# Patient Record
Sex: FEMALE | Race: BLACK OR AFRICAN AMERICAN | Employment: FULL TIME | URBAN - METROPOLITAN AREA
[De-identification: names, ages, dates, MRNs, and addresses within clinical notes are randomized per-mention and may not be internally consistent; named-entity substitution may affect disease eponyms.]

---

## 2021-03-27 ENCOUNTER — APPOINTMENT (OUTPATIENT)
Dept: GENERAL RADIOLOGY | Age: 36
End: 2021-03-27
Payer: COMMERCIAL

## 2021-03-27 ENCOUNTER — HOSPITAL ENCOUNTER (EMERGENCY)
Age: 36
Discharge: HOME | End: 2021-03-27
Attending: EMERGENCY MEDICINE
Payer: COMMERCIAL

## 2021-03-27 VITALS
SYSTOLIC BLOOD PRESSURE: 140 MMHG | RESPIRATION RATE: 18 BRPM | WEIGHT: 220 LBS | TEMPERATURE: 98.2 F | OXYGEN SATURATION: 100 % | DIASTOLIC BLOOD PRESSURE: 78 MMHG | HEIGHT: 70 IN | HEART RATE: 80 BPM | BODY MASS INDEX: 31.5 KG/M2

## 2021-03-27 DIAGNOSIS — S83.511A SPRAIN OF ANTERIOR CRUCIATE LIGAMENT OF RIGHT KNEE, INITIAL ENCOUNTER: Primary | ICD-10-CM

## 2021-03-27 PROCEDURE — 99284 EMERGENCY DEPT VISIT MOD MDM: CPT

## 2021-03-27 PROCEDURE — 73562 X-RAY EXAM OF KNEE 3: CPT

## 2021-03-27 RX ORDER — ACETAMINOPHEN 500 MG
1000 TABLET ORAL ONCE
Status: DISCONTINUED | OUTPATIENT
Start: 2021-03-27 | End: 2021-03-27 | Stop reason: HOSPADM

## 2021-03-27 RX ORDER — IBUPROFEN 400 MG/1
400 TABLET ORAL EVERY 6 HOURS PRN
Qty: 30 TABLET | Refills: 0 | Status: SHIPPED | OUTPATIENT
Start: 2021-03-27

## 2021-03-27 RX ORDER — IBUPROFEN 800 MG/1
800 TABLET ORAL ONCE
Status: DISCONTINUED | OUTPATIENT
Start: 2021-03-27 | End: 2021-03-27 | Stop reason: HOSPADM

## 2021-03-27 NOTE — LETTER
[facility]  [address] [address]  Phone: [phone]  Fax: [phone]               March 27, 2021    Patient: Bette Miller   YOB: 1985   Date of Visit: 3/27/2021       To Whom It May Concern:    Bette Miller was seen and treated in our emergency department on 3/27/2021. She may return to work on Vishal the 28th.       Sincerely,       Jc Villalobos MD         Signature:__________________________________

## 2021-03-27 NOTE — ED NOTES
Wrapped patients right knee with 6 inch ace wrap per Dr. Darlyn Zamudio. Patient verbalized understanding of care taking instructions.       [facility]  03/27/21 2340

## 2021-03-27 NOTE — ED PROVIDER NOTES
COMPARTMENT SYNDROME, DEEP VENOUS THROMBOSIS, SEPTIC ARTHRITIS, TENDON OR NEUROVASCULAR INJURY, thus I consider the discharge disposition reasonable. Fran Mclaughlin and I have discussed the diagnosis and risks, and we agree with discharging home to follow-up with their primary doctor or the referral orthopedist. We also discussed returning to the Emergency Department immediately if new or worsening symptoms occur. We have discussed the symptoms which are most concerning (e.g., changing or worsening pain, numbness, weakness) that necessitate immediate return. Final Impression    1. Sprain of anterior cruciate ligament of right knee, initial encounter        Blood pressure (!) 140/78, pulse 80, temperature 98.2 °F (36.8 °C), temperature source Oral, resp. rate 18, height 5' 10\" (1.778 m), weight 220 lb (99.8 kg), last menstrual period 03/27/2021, SpO2 100 %. Patient was given scripts for the following medications. I counseled patient how to take these medications. Discharge Medication List as of 3/27/2021  2:15 AM      START taking these medications    Details   ibuprofen (ADVIL;MOTRIN) 400 MG tablet Take 1 tablet by mouth every 6 hours as needed for Pain, Disp-30 tablet, R-0Print             Disposition  Pt is in good condition upon Discharge to home. This chart was generated using the [employer] dictation system. I created this record but it may contain dictation errors.           Cale Stewart MD  03/27/21 2079

## 2023-01-09 PROBLEM — Z00.00 ENCOUNTER FOR PREVENTIVE HEALTH EXAMINATION: Status: ACTIVE | Noted: 2023-01-09

## 2023-01-17 ENCOUNTER — APPOINTMENT (OUTPATIENT)
Dept: OBGYN | Facility: CLINIC | Age: 38
End: 2023-01-17
Payer: COMMERCIAL

## 2023-01-17 ENCOUNTER — TRANSCRIPTION ENCOUNTER (OUTPATIENT)
Age: 38
End: 2023-01-17

## 2023-01-17 ENCOUNTER — NON-APPOINTMENT (OUTPATIENT)
Age: 38
End: 2023-01-17

## 2023-01-17 VITALS
DIASTOLIC BLOOD PRESSURE: 62 MMHG | SYSTOLIC BLOOD PRESSURE: 118 MMHG | BODY MASS INDEX: 35.93 KG/M2 | HEIGHT: 70 IN | WEIGHT: 251 LBS

## 2023-01-17 DIAGNOSIS — Z87.59 PERSONAL HISTORY OF OTHER COMPLICATIONS OF PREGNANCY, CHILDBIRTH AND THE PUERPERIUM: ICD-10-CM

## 2023-01-17 DIAGNOSIS — Z78.9 OTHER SPECIFIED HEALTH STATUS: ICD-10-CM

## 2023-01-17 DIAGNOSIS — Z82.49 FAMILY HISTORY OF ISCHEMIC HEART DISEASE AND OTHER DISEASES OF THE CIRCULATORY SYSTEM: ICD-10-CM

## 2023-01-17 DIAGNOSIS — D57.3 SICKLE-CELL TRAIT: ICD-10-CM

## 2023-01-17 DIAGNOSIS — Z80.3 FAMILY HISTORY OF MALIGNANT NEOPLASM OF BREAST: ICD-10-CM

## 2023-01-17 PROCEDURE — 36415 COLL VENOUS BLD VENIPUNCTURE: CPT

## 2023-01-17 PROCEDURE — 0501F PRENATAL FLOW SHEET: CPT

## 2023-01-18 PROBLEM — Z82.49 FAMILY HISTORY OF HYPERTENSION: Status: ACTIVE | Noted: 2023-01-18

## 2023-01-18 PROBLEM — D57.3 SICKLE CELL TRAIT: Status: ACTIVE | Noted: 2023-01-18

## 2023-01-18 PROBLEM — Z78.9 DOES NOT USE ILLICIT DRUGS: Status: ACTIVE | Noted: 2023-01-18

## 2023-01-18 PROBLEM — Z78.9 SOCIAL ALCOHOL USE: Status: ACTIVE | Noted: 2023-01-18

## 2023-01-18 PROBLEM — Z78.9 NON-SMOKER: Status: ACTIVE | Noted: 2023-01-18

## 2023-01-18 PROBLEM — Z87.59 HISTORY OF ECTOPIC PREGNANCY: Status: RESOLVED | Noted: 2023-01-18 | Resolved: 2023-01-18

## 2023-01-18 PROBLEM — Z80.3 FAMILY HISTORY OF BREAST CANCER: Status: ACTIVE | Noted: 2023-01-18

## 2023-01-18 LAB
ABO + RH PNL BLD: NORMAL
B19V IGG SER QL IA: 0.42 INDEX
B19V IGG+IGM SER-IMP: NEGATIVE
B19V IGG+IGM SER-IMP: NORMAL
B19V IGM FLD-ACNC: 0.16 INDEX
B19V IGM SER-ACNC: NEGATIVE
BASOPHILS # BLD AUTO: 0.03 K/UL
BASOPHILS NFR BLD AUTO: 0.3 %
BILIRUB UR QL STRIP: NORMAL
BLD GP AB SCN SERPL QL: NORMAL
EOSINOPHIL # BLD AUTO: 0.15 K/UL
EOSINOPHIL NFR BLD AUTO: 1.5 %
GLUCOSE UR-MCNC: NORMAL
HBV SURFACE AG SERPL QL IA: NONREACTIVE
HCG UR QL: 0.2 EU/DL
HCT VFR BLD CALC: 38 %
HCV AB SER QL: NONREACTIVE
HCV S/CO RATIO: 0.06 S/CO
HGB BLD-MCNC: 12 G/DL
HGB UR QL STRIP.AUTO: NORMAL
HIV1+2 AB SPEC QL IA.RAPID: NONREACTIVE
IMM GRANULOCYTES NFR BLD AUTO: 0.8 %
KETONES UR-MCNC: NORMAL
LEUKOCYTE ESTERASE UR QL STRIP: NORMAL
LYMPHOCYTES # BLD AUTO: 1.41 K/UL
LYMPHOCYTES NFR BLD AUTO: 14.4 %
MAN DIFF?: NORMAL
MCHC RBC-ENTMCNC: 27.5 PG
MCHC RBC-ENTMCNC: 31.6 GM/DL
MCV RBC AUTO: 87.2 FL
MEV IGG FLD QL IA: >300 AU/ML
MEV IGG+IGM SER-IMP: POSITIVE
MONOCYTES # BLD AUTO: 0.67 K/UL
MONOCYTES NFR BLD AUTO: 6.8 %
NEUTROPHILS # BLD AUTO: 7.45 K/UL
NEUTROPHILS NFR BLD AUTO: 76.2 %
NITRITE UR QL STRIP: NORMAL
PH UR STRIP: 5.5
PLATELET # BLD AUTO: 317 K/UL
PROT UR STRIP-MCNC: NORMAL
RBC # BLD: 4.36 M/UL
RBC # FLD: 14.6 %
RUBV IGG FLD-ACNC: 4.7 INDEX
RUBV IGG SER-IMP: POSITIVE
SOURCE AMPLIFICATION: NORMAL
SP GR UR STRIP: 1.02
T PALLIDUM AB SER QL IA: NEGATIVE
T VAGINALIS RRNA SPEC QL NAA+PROBE: NOT DETECTED
TSH SERPL-ACNC: 1.03 UIU/ML
VZV AB TITR SER: NEGATIVE
VZV IGG SER IF-ACNC: 43.5 INDEX
WBC # FLD AUTO: 9.79 K/UL

## 2023-01-18 RX ORDER — ASCORBIC ACID, CHOLECALCIFEROL, .ALPHA.-TOCOPHEROL ACETATE, DL-, PYRIDOXINE, FOLIC ACID, CYANOCOBALAMIN, CALCIUM, FERROUS FUMARATE, MAGNESIUM, DOCONEXENT 85; 200; 10; 25; 1; 12; 140; 27; 45; 300 [IU]/1; [IU]/1; [IU]/1; [IU]/1; MG/1; UG/1; MG/1; MG/1; MG/1; MG/1
27-0.6-0.4-3 CAPSULE, GELATIN COATED ORAL
Qty: 1 | Refills: 11 | Status: ACTIVE | COMMUNITY
Start: 2023-01-18 | End: 1900-01-01

## 2023-01-20 ENCOUNTER — NON-APPOINTMENT (OUTPATIENT)
Age: 38
End: 2023-01-20

## 2023-01-20 DIAGNOSIS — Z28.39 SUPERVISION OF OTHER HIGH RISK PREGNANCIES, UNSPECIFIED TRIMESTER: ICD-10-CM

## 2023-01-20 DIAGNOSIS — O09.899 SUPERVISION OF OTHER HIGH RISK PREGNANCIES, UNSPECIFIED TRIMESTER: ICD-10-CM

## 2023-01-22 LAB
BACTERIA UR CULT: NORMAL
C TRACH RRNA SPEC QL NAA+PROBE: NOT DETECTED
HGB A MFR BLD: 62.8 %
HGB A2 MFR BLD: 3.2 %
HGB FRACT BLD-IMP: NORMAL
HGB S BLD QL SOLY: POSITIVE
HGB S MFR BLD: 34 %
LEAD BLD-MCNC: <1 UG/DL
N GONORRHOEA RRNA SPEC QL NAA+PROBE: NOT DETECTED
SOURCE AMPLIFICATION: NORMAL

## 2023-01-23 ENCOUNTER — NON-APPOINTMENT (OUTPATIENT)
Age: 38
End: 2023-01-23

## 2023-01-23 PROBLEM — O09.899 MATERNAL VARICELLA, NON-IMMUNE: Status: RESOLVED | Noted: 2023-01-20 | Resolved: 2023-01-23

## 2023-01-26 ENCOUNTER — NON-APPOINTMENT (OUTPATIENT)
Age: 38
End: 2023-01-26

## 2023-02-09 ENCOUNTER — ASOB RESULT (OUTPATIENT)
Age: 38
End: 2023-02-09

## 2023-02-09 ENCOUNTER — APPOINTMENT (OUTPATIENT)
Dept: ANTEPARTUM | Facility: CLINIC | Age: 38
End: 2023-02-09
Payer: COMMERCIAL

## 2023-02-09 PROCEDURE — 76811 OB US DETAILED SNGL FETUS: CPT

## 2023-02-09 PROCEDURE — 76817 TRANSVAGINAL US OBSTETRIC: CPT | Mod: 59

## 2023-02-16 ENCOUNTER — APPOINTMENT (OUTPATIENT)
Dept: ANTEPARTUM | Facility: CLINIC | Age: 38
End: 2023-02-16

## 2023-02-16 ENCOUNTER — APPOINTMENT (OUTPATIENT)
Dept: ANTEPARTUM | Facility: CLINIC | Age: 38
End: 2023-02-16
Payer: COMMERCIAL

## 2023-02-16 ENCOUNTER — ASOB RESULT (OUTPATIENT)
Age: 38
End: 2023-02-16

## 2023-02-16 PROCEDURE — 76816 OB US FOLLOW-UP PER FETUS: CPT

## 2023-02-24 ENCOUNTER — APPOINTMENT (OUTPATIENT)
Dept: OBGYN | Facility: CLINIC | Age: 38
End: 2023-02-24
Payer: COMMERCIAL

## 2023-02-24 VITALS
BODY MASS INDEX: 37.22 KG/M2 | SYSTOLIC BLOOD PRESSURE: 122 MMHG | HEIGHT: 70 IN | WEIGHT: 260 LBS | DIASTOLIC BLOOD PRESSURE: 70 MMHG

## 2023-02-24 LAB
BILIRUB UR QL STRIP: NORMAL
GLUCOSE UR-MCNC: NORMAL
HCG UR QL: 0.2 EU/DL
HGB UR QL STRIP.AUTO: NORMAL
KETONES UR-MCNC: NORMAL
LEUKOCYTE ESTERASE UR QL STRIP: NORMAL
NITRITE UR QL STRIP: NORMAL
PH UR STRIP: 6
PROT UR STRIP-MCNC: NORMAL
SP GR UR STRIP: 1.02

## 2023-02-24 PROCEDURE — 0502F SUBSEQUENT PRENATAL CARE: CPT

## 2023-03-19 ENCOUNTER — NON-APPOINTMENT (OUTPATIENT)
Age: 38
End: 2023-03-19

## 2023-03-22 ENCOUNTER — NON-APPOINTMENT (OUTPATIENT)
Age: 38
End: 2023-03-22

## 2023-03-22 ENCOUNTER — APPOINTMENT (OUTPATIENT)
Dept: ANTEPARTUM | Facility: CLINIC | Age: 38
End: 2023-03-22

## 2023-03-22 ENCOUNTER — INPATIENT (INPATIENT)
Facility: HOSPITAL | Age: 38
LOS: 0 days | Discharge: HOME | End: 2023-03-23
Attending: OBSTETRICS & GYNECOLOGY | Admitting: OBSTETRICS & GYNECOLOGY
Payer: COMMERCIAL

## 2023-03-22 VITALS
HEART RATE: 106 BPM | RESPIRATION RATE: 12 BRPM | TEMPERATURE: 98 F | DIASTOLIC BLOOD PRESSURE: 71 MMHG | SYSTOLIC BLOOD PRESSURE: 117 MMHG

## 2023-03-22 DIAGNOSIS — Z87.59 PERSONAL HISTORY OF OTHER COMPLICATIONS OF PREGNANCY, CHILDBIRTH AND THE PUERPERIUM: Chronic | ICD-10-CM

## 2023-03-22 DIAGNOSIS — Z98.890 OTHER SPECIFIED POSTPROCEDURAL STATES: Chronic | ICD-10-CM

## 2023-03-22 DIAGNOSIS — O26.899 OTHER SPECIFIED PREGNANCY RELATED CONDITIONS, UNSPECIFIED TRIMESTER: ICD-10-CM

## 2023-03-22 LAB
ALBUMIN SERPL ELPH-MCNC: 3.9 G/DL — SIGNIFICANT CHANGE UP (ref 3.3–5)
ALP SERPL-CCNC: 79 U/L — SIGNIFICANT CHANGE UP (ref 40–120)
ALT FLD-CCNC: 14 U/L — SIGNIFICANT CHANGE UP (ref 4–33)
ANION GAP SERPL CALC-SCNC: 12 MMOL/L — SIGNIFICANT CHANGE UP (ref 7–14)
APPEARANCE UR: ABNORMAL
APTT BLD: 30.1 SEC — SIGNIFICANT CHANGE UP (ref 27–36.3)
AST SERPL-CCNC: 14 U/L — SIGNIFICANT CHANGE UP (ref 4–32)
BACTERIA # UR AUTO: NEGATIVE — SIGNIFICANT CHANGE UP
BASOPHILS # BLD AUTO: 0.03 K/UL — SIGNIFICANT CHANGE UP (ref 0–0.2)
BASOPHILS NFR BLD AUTO: 0.2 % — SIGNIFICANT CHANGE UP (ref 0–2)
BILIRUB SERPL-MCNC: 0.2 MG/DL — SIGNIFICANT CHANGE UP (ref 0.2–1.2)
BILIRUB UR-MCNC: NEGATIVE — SIGNIFICANT CHANGE UP
BUN SERPL-MCNC: 8 MG/DL — SIGNIFICANT CHANGE UP (ref 7–23)
CALCIUM SERPL-MCNC: 9.1 MG/DL — SIGNIFICANT CHANGE UP (ref 8.4–10.5)
CHLORIDE SERPL-SCNC: 105 MMOL/L — SIGNIFICANT CHANGE UP (ref 98–107)
CO2 SERPL-SCNC: 20 MMOL/L — LOW (ref 22–31)
COLOR SPEC: YELLOW — SIGNIFICANT CHANGE UP
CREAT ?TM UR-MCNC: 133 MG/DL — SIGNIFICANT CHANGE UP
CREAT SERPL-MCNC: 0.7 MG/DL — SIGNIFICANT CHANGE UP (ref 0.5–1.3)
DIFF PNL FLD: NEGATIVE — SIGNIFICANT CHANGE UP
EGFR: 112 ML/MIN/1.73M2 — SIGNIFICANT CHANGE UP
EOSINOPHIL # BLD AUTO: 0.19 K/UL — SIGNIFICANT CHANGE UP (ref 0–0.5)
EOSINOPHIL NFR BLD AUTO: 1.6 % — SIGNIFICANT CHANGE UP (ref 0–6)
EPI CELLS # UR: 2 /HPF — SIGNIFICANT CHANGE UP (ref 0–5)
FIBRINOGEN PPP-MCNC: 802 MG/DL — HIGH (ref 200–465)
GLUCOSE SERPL-MCNC: 109 MG/DL — HIGH (ref 70–99)
GLUCOSE UR QL: NEGATIVE — SIGNIFICANT CHANGE UP
HCT VFR BLD CALC: 30.4 % — LOW (ref 34.5–45)
HGB BLD-MCNC: 9.6 G/DL — LOW (ref 11.5–15.5)
HYALINE CASTS # UR AUTO: 7 /LPF — SIGNIFICANT CHANGE UP (ref 0–7)
IANC: 9.05 K/UL — HIGH (ref 1.8–7.4)
IMM GRANULOCYTES NFR BLD AUTO: 2.4 % — HIGH (ref 0–0.9)
INR BLD: 1.03 RATIO — SIGNIFICANT CHANGE UP (ref 0.88–1.16)
KETONES UR-MCNC: NEGATIVE — SIGNIFICANT CHANGE UP
LDH SERPL L TO P-CCNC: 138 U/L — SIGNIFICANT CHANGE UP (ref 135–225)
LEUKOCYTE ESTERASE UR-ACNC: NEGATIVE — SIGNIFICANT CHANGE UP
LYMPHOCYTES # BLD AUTO: 1.42 K/UL — SIGNIFICANT CHANGE UP (ref 1–3.3)
LYMPHOCYTES # BLD AUTO: 11.8 % — LOW (ref 13–44)
MCHC RBC-ENTMCNC: 26.7 PG — LOW (ref 27–34)
MCHC RBC-ENTMCNC: 31.6 GM/DL — LOW (ref 32–36)
MCV RBC AUTO: 84.7 FL — SIGNIFICANT CHANGE UP (ref 80–100)
MONOCYTES # BLD AUTO: 1.09 K/UL — HIGH (ref 0–0.9)
MONOCYTES NFR BLD AUTO: 9 % — SIGNIFICANT CHANGE UP (ref 2–14)
NEUTROPHILS # BLD AUTO: 9.05 K/UL — HIGH (ref 1.8–7.4)
NEUTROPHILS NFR BLD AUTO: 75 % — SIGNIFICANT CHANGE UP (ref 43–77)
NITRITE UR-MCNC: NEGATIVE — SIGNIFICANT CHANGE UP
NRBC # BLD: 0 /100 WBCS — SIGNIFICANT CHANGE UP (ref 0–0)
NRBC # FLD: 0 K/UL — SIGNIFICANT CHANGE UP (ref 0–0)
PH UR: 6 — SIGNIFICANT CHANGE UP (ref 5–8)
PLATELET # BLD AUTO: 334 K/UL — SIGNIFICANT CHANGE UP (ref 150–400)
POTASSIUM SERPL-MCNC: 3.9 MMOL/L — SIGNIFICANT CHANGE UP (ref 3.5–5.3)
POTASSIUM SERPL-SCNC: 3.9 MMOL/L — SIGNIFICANT CHANGE UP (ref 3.5–5.3)
PROT ?TM UR-MCNC: 15 MG/DL — SIGNIFICANT CHANGE UP
PROT ?TM UR-MCNC: 15 MG/DL — SIGNIFICANT CHANGE UP
PROT SERPL-MCNC: 7 G/DL — SIGNIFICANT CHANGE UP (ref 6–8.3)
PROT UR-MCNC: ABNORMAL
PROT/CREAT UR-RTO: 0.1 RATIO — SIGNIFICANT CHANGE UP (ref 0–0.2)
PROTHROM AB SERPL-ACNC: 11.9 SEC — SIGNIFICANT CHANGE UP (ref 10.5–13.4)
RBC # BLD: 3.59 M/UL — LOW (ref 3.8–5.2)
RBC # FLD: 13.5 % — SIGNIFICANT CHANGE UP (ref 10.3–14.5)
RBC CASTS # UR COMP ASSIST: 5 /HPF — HIGH (ref 0–4)
SODIUM SERPL-SCNC: 137 MMOL/L — SIGNIFICANT CHANGE UP (ref 135–145)
SP GR SPEC: 1.02 — SIGNIFICANT CHANGE UP (ref 1.01–1.05)
URATE SERPL-MCNC: 3.9 MG/DL — SIGNIFICANT CHANGE UP (ref 2.5–7)
UROBILINOGEN FLD QL: SIGNIFICANT CHANGE UP
WBC # BLD: 12.07 K/UL — HIGH (ref 3.8–10.5)
WBC # FLD AUTO: 12.07 K/UL — HIGH (ref 3.8–10.5)
WBC UR QL: 43 /HPF — HIGH (ref 0–5)

## 2023-03-22 RX ORDER — ACETAMINOPHEN 500 MG
1000 TABLET ORAL ONCE
Refills: 0 | Status: COMPLETED | OUTPATIENT
Start: 2023-03-22 | End: 2023-03-22

## 2023-03-22 RX ORDER — SODIUM CHLORIDE 9 MG/ML
1000 INJECTION, SOLUTION INTRAVENOUS ONCE
Refills: 0 | Status: COMPLETED | OUTPATIENT
Start: 2023-03-22 | End: 2023-03-22

## 2023-03-22 RX ORDER — ACETAMINOPHEN 500 MG
1000 TABLET ORAL ONCE
Refills: 0 | Status: DISCONTINUED | OUTPATIENT
Start: 2023-03-22 | End: 2023-03-22

## 2023-03-22 RX ORDER — DIPHENHYDRAMINE HCL 50 MG
25 CAPSULE ORAL ONCE
Refills: 0 | Status: COMPLETED | OUTPATIENT
Start: 2023-03-22 | End: 2023-03-22

## 2023-03-22 RX ORDER — METOCLOPRAMIDE HCL 10 MG
10 TABLET ORAL ONCE
Refills: 0 | Status: COMPLETED | OUTPATIENT
Start: 2023-03-22 | End: 2023-03-22

## 2023-03-22 RX ADMIN — Medication 400 MILLIGRAM(S): at 19:11

## 2023-03-22 RX ADMIN — Medication 25 MILLIGRAM(S): at 14:48

## 2023-03-22 RX ADMIN — Medication 2 TABLET(S): at 23:43

## 2023-03-22 RX ADMIN — Medication 25 MILLIGRAM(S): at 23:50

## 2023-03-22 RX ADMIN — Medication 1000 MILLIGRAM(S): at 19:40

## 2023-03-22 RX ADMIN — SODIUM CHLORIDE 1000 MILLILITER(S): 9 INJECTION, SOLUTION INTRAVENOUS at 18:52

## 2023-03-22 RX ADMIN — Medication 10 MILLIGRAM(S): at 14:48

## 2023-03-22 NOTE — OB PROVIDER TRIAGE NOTE - HISTORY OF PRESENT ILLNESS
Ms. RAOUL PRUITT is a 38y  28w2d p/w frontal headache 6 days. Comes and goes. Worse with movement and light. Improves with tylenol, last dose 11:30am 1000mg.     PNC: Denies prenatal issues or complications. Pt reports no hospitalizations, procedures, infections, or new diagnoses in pregnancy. Pt denies complications with blood pressure and/or blood sugar. LMP:  Ms. RAOUL PRUITT is a 38y  28w2d p/w frontal headache 6 days. Comes and goes. Worse with movement and light. Improves with tylenol, last dose 11:30am 1000mg. Has been getting very little sleep during the pregnancy because just cannot fell asleep when lying in bed. + FM. Denies ctx / vb / lof , head trauma, nausea, vomiting, strenuous activity, abrupt onset, history of migraines, recent injections of back, fevers, chills, new rashes, recent life-style changes or significant life events, anxiety / depression, issues with BP, right-sided abdominal pain, visual disturbances. C/S @ 37w due to hx of myomectomy.   PNC: Denies prenatal issues or complications. Pt reports no hospitalizations, procedures, infections, or new diagnoses in pregnancy. Pt denies complications with blood pressure and/or blood sugar. LMP:

## 2023-03-22 NOTE — OB RN TRIAGE NOTE - FALL HARM RISK - UNIVERSAL INTERVENTIONS
Bed in lowest position, wheels locked, appropriate side rails in place/Call bell, personal items and telephone in reach/Instruct patient to call for assistance before getting out of bed or chair/Non-slip footwear when patient is out of bed/[place] to call system/Physically safe environment - no spills, clutter or unnecessary equipment/Purposeful Proactive Rounding/Room/bathroom lighting operational, light cord in reach

## 2023-03-22 NOTE — OB PROVIDER TRIAGE NOTE - NSHPPHYSICALEXAM_GEN_ALL_CORE
T(C): 36.9 (03-22-23 @ 13:55), Max: 36.9 (03-22-23 @ 13:55)  HR: 80 (03-22-23 @ 15:17) (80 - 106)  BP: 98/53 (03-22-23 @ 15:17) (98/53 - 127/64)  RR: 12 (03-22-23 @ 13:55) (12 - 12)  SpO2: --  General: Female sitting comfortably. Squinting eyes from light.   Head: Normocephalic. Atraumatic.   Eyes: No discharge, lids normal, conjunctiva normal  Lungs: No resp distress  Abdomen: Soft, nontender. Gravid.   TAUS:  Sono saved in ASOB.   Neuro: No facial asymmetry, no slurred speech, moves all 4 extremities  Mood: Alert and lucid, appropriate mood and affect

## 2023-03-22 NOTE — CONSULT NOTE ADULT - SUBJECTIVE AND OBJECTIVE BOX
Neurology - Consult Note    -  Spectra: [phone] ([facility]), [id number] ([facility])  -    HPI: Patient RAOUL PRUITT is a 38y (1985)   28w2d  woman with a PMHx significant for ectopic pregnancy 2002 presented with L frontal HA of 6 days duration. HA has been intermittent, 9/10 at worst (currently 8/10), throbbing, initially came on over an hour duration, worsened with any minor movement in any direction, with associated photophobia and nausea but pt denies any phonophobia, neck pain/stiffness, fever, chills, diplopia, tinnitus, blurry vision, vomiting, numbness, tingling, focal weakness, changes in vision/hearing, trauma, hx of headaches/migraines, use of retinoids, smoking hx, birth control pills, hx of strokes/seizures. . Also states that headache does not get worse with changes in position- particularly from sitting to laying flat or from laying flat to sitting up. Pt took 1000 mg tylenol Q6 hours for the first 2 days but then stopped taking it- helped improve the HA but it came back after she stopped taking it. Received reglan and benadryl here in hospital which improved the HA a little bit. LMP 2022. Takes prenatals and baby ASA daily.       Review of Systems:   CONSTITUTIONAL: No fevers or chills  EYES AND ENT: No visual changes or no throat pain   NECK: No pain or stiffness  RESPIRATORY: No hemoptysis or shortness of breath  CARDIOVASCULAR: No chest pain or palpitations  NEUROLOGICAL: +As stated in HPI above  SKIN: No itching, burning, rashes, or lesions   All other review of systems is negative unless indicated above.    Allergies: NKDA      PMHx/PSHx/Family Hx: As above, otherwise see below       Social Hx:  No current use of tobacco, alcohol, or illicit drugs    Medications:  MEDICATIONS  (STANDING):    MEDICATIONS  (PRN):      Vitals:  T(C): 36.9 (23 @ 13:55), Max: 36.9 (23 @ 13:55)  HR: 100 (23 @ 17:17) (78 - 106)  BP: 120/61 (23 @ 17:17) (98/53 - 127/64)  RR: 12 (23 @ 13:55) (12 - )  SpO2: --    Physical Examination:   General - NAD  Cardiovascular - Peripheral pulses palpable, no edema  Eyes - Fundoscopy not performed due to safety precautions in the setting of the COVID-19 pandemic    Neurologic Exam:  Mental status - Awake, Alert, Oriented to person, place, and time. Speech fluent, repetition and naming intact. Follows simple and complex commands. Attention/concentration, recent and remote memory (including registration and recall), and fund of knowledge intact    Cranial nerves - PERRLA, VFF, EOMI, no diplopia or nystagmus. face sensation (V1-V3) intact b/l, facial strength intact without asymmetry b/l, hearing intact b/l, palate with symmetric elevation, sternocleidomastiod 5/5 strength b/l, tongue midline on protrusion with full lateral movement    Motor - Normal bulk and tone throughout. No pronator drift.  Strength testing            Deltoid      Biceps      Triceps     Wrist Extension    Wrist Flexion     Interossei         R            5                 5               5                     5                              5                        5                 5  L             5                 5               5                     5                              5                        5                 5              Hip Flexion    Hip Extension    Knee Flexion    Knee Extension    Dorsiflexion    Plantar Flexion  R              5                           5                       5                           5                            5                          5  L              5                           5                        5                           5                            5                          5    Sensation - Light touch/temperature  intact throughout    DTR's -             Biceps      Triceps     Brachioradialis      Patellar    Ankle    Toes/plantar response  R             2+             2+                  2+              3+            2+                 Down  L              2+             2+                 2+               3+           2+                 Down    Coordination - Finger to Nose intact b/l. No tremors appreciated    Gait and station - unable to perform due to fall risk    Labs:                        9.6    12.07 )-----------( 334      ( 22 Mar 2023 14:35 )             30.4         137  |  105  |  8   ----------------------------<  109<H>  3.9   |  20<L>  |  0.70    Ca    9.1      22 Mar 2023 14:35    TPro  7.0  /  Alb  3.9  /  TBili  0.2  /  DBili  x   /  AST  14  /  ALT  14  /  AlkPhos  79      CAPILLARY BLOOD GLUCOSE        LIVER FUNCTIONS - ( 22 Mar 2023 14:35 )  Alb: 3.9 g/dL / Pro: 7.0 g/dL / ALK PHOS: 79 U/L / ALT: 14 U/L / AST: 14 U/L / GGT: x             PT/INR - ( 22 Mar 2023 14:35 )   PT: 11.9 sec;   INR: 1.03 ratio         PTT - ( 22 Mar 2023 14:35 )  PTT:30.1 sec  CSF:                  Radiology:

## 2023-03-22 NOTE — CONSULT NOTE ADULT - ASSESSMENT
Patient RAOUL PRUITT is a 38y (1985)   28w2d  woman with a PMHx significant for ectopic pregnancy  presented with L frontal HA of 6 days duration for which neurology called.     Neuro exam nonfocal.     Impression: Intermittent L sided HA with associated photophobia/nausea likely 2/2 new onset migraines. Also on the differential is CVST but will need brain venous imaging to rule out thrombosis, also on the differential is cervical neuralgia but may be of lower concern as pt reports no neck pain or stiffness.     Recommendations:   MRI brain w/o contrast  MRV head/neck with contrast  MRA head w/o contrast, MRA neck with contrast  PT/OT    HA management:   []If good kidney function, toradol 30 mg IV push + 10 mg reglan IV push + 25 mg benadryl IV push at once   Otherwise Tylenol 1 gram IV + 10 mg reglan IV push + 25 mg benadryl IV push at once    [] Darken/quiet room  [] Encourage avoidance of common migraine triggers (artificial sweeteners, food preservative (MSG), aged cheese, skipping meals, change in wake/sleep cycle and intense physical exertion)  [] Encourage lifestyle changes that help migraine: physical exercise, fixed meal time, fixed sleep/wake cycle, avoid smoking and highly caffeinated products     Patient can follow up with neurologist Dr. denise after discharge. Please instruct the patient to call [phone] to schedule an appointment. Office is located at [address] [place] [address].     To be discussed with neurology attending and will be formally staffed by attending during morning rounds. Recommendations will be finalized once signed by attending.      Patient RAOUL PRUITT is a 38y (1985)   28w2d  woman with a PMHx significant for ectopic pregnancy  presented with L frontal HA of 6 days duration for which neurology called.     Neuro exam nonfocal.     Impression: Intermittent L sided HA with associated photophobia/nausea likely 2/2 new onset migraines. Also on the differential is CVST but will need brain venous imaging to rule out thrombosis, also on the differential is cervical neuralgia but may be of lower concern as pt reports no neck pain or stiffness.     Recommendations:   MRI brain w/o contrast  MRV head/neck with contrast (only imaging that really need contrast for to visualize)  MRA head w/o contrast, MRA neck with contrast  PT/OT    HA management:   []If good kidney function, toradol 30 mg IV push + 10 mg reglan IV push + 25 mg benadryl IV push at once, if no contraindications  Otherwise Tylenol 1 gram IV + 10 mg reglan IV push + 25 mg benadryl IV push at once, if no contrast    [] Darken/quiet room  [] Encourage avoidance of common migraine triggers (artificial sweeteners, food preservative (MSG), aged cheese, skipping meals, change in wake/sleep cycle and intense physical exertion)  [] Encourage lifestyle changes that help migraine: physical exercise, fixed meal time, fixed sleep/wake cycle, avoid smoking and highly caffeinated products     Patient can follow up with neurologist Dr. denise after discharge. Please instruct the patient to call [phone] to schedule an appointment. Office is located at [address] [place] [address].     To be discussed with neurology attending and will be formally staffed by attending during morning rounds. Recommendations will be finalized once signed by attending.

## 2023-03-22 NOTE — OB RN TRIAGE NOTE - SUICIDE SCREENING QUESTION 3
“You can access the FollowHealth Patient Portal, offered by [facility], by registering with the following website: [email or web address]” No

## 2023-03-22 NOTE — OB PROVIDER TRIAGE NOTE - NSOBPROVIDERNOTE_OBGYN_ALL_OB_FT
Ms. RAOUL PRUITT is a 38y  28w2d p/w frontal headache 6 days.    - Reglan 10, benadryl 25 IV   - PECC labs  - Continue to observe Ms. RAOUL PRUITT is a 38y  28w2d p/w frontal headache 6 days.    - Reglan 10, benadryl 25 IV   - PECC labs  - Continue to observe  - 15:32: Patient sleeping comfortably. Notes that she feels 80% better. Ms. RAOUL PRUITT is a 38y  28w2d p/w frontal headache 6 days without evidence of preeclampsia.     - Reglan 10, benadryl 25 IV   - PECC labs  - Continue to observe  - 15:32: Patient sleeping comfortably. Notes that she feels 80% better.  - 16:30: Patient awoken for BPP and notes that headache is slightly better but returning.   - Dr. Marx notified, recommendation for neurology consult     Plan   [] Neurology consult Ms. RAOUL PRUITT is a 38y  28w2d p/w frontal headache 6 days without evidence of preeclampsia.     - Reglan 10, benadryl 25 IV   - PECC labs  - Continue to observe  - 15:32: Patient sleeping comfortably. Notes that she feels 80% better.  - 16:30: Patient awoken for BPP and notes that headache is slightly better but returning.   - Dr. Marx notified, recommendation for neurology consult     Plan   [] Neurology consult placed Ms. RAOUL PRUITT is a 38y  28w2d p/w frontal headache 6 days without evidence of preeclampsia.     - Reglan 10, benadryl 25 IV   - PECC labs  - Continue to observe  - 15:32: Patient sleeping comfortably. Notes that she feels 80% better.  - 16:30: Patient awoken for BPP and notes that headache is slightly better but returning.   - Dr. Marx notified, recommendation for neurology consult   - 19:00: Radiology called to protocol imaging.   - Radiology technician @ [id number] without response  - Patient would like pain medication. For 1000mg IV Tylenol and 1 liter LR, d/w Dr. Hernandez    Plan   [] Patient to wait in alcove pending MRA / MRV without contrast    Plan d/w Dr. Hernandez Ms. RAOUL PRUITT is a 38y  28w2d p/w frontal headache 6 days without evidence of preeclampsia.     - Reglan 10, benadryl 25 IV   - PECC labs  - Continue to observe  - 15:32: Patient sleeping comfortably. Notes that she feels 80% better.  - 16:30: Patient awoken for BPP and notes that headache is slightly better but returning.   - Dr. Marx notified, recommendation for neurology consult   - 19:00: Radiology called to protocol imaging.   - Radiology technician @ 0385 without response  - Patient would like pain medication. For 1000mg IV Tylenol and 1 liter LR, d/w Dr. Hernandez    Plan   [] Patient to wait in alcove pending MRA / MRV without contrast    Plan d/w Dr. Hernandez    19:15 PABLO Hicks NP  Received care of patient   Full relief from IV Tylenol as per patient, 0/10 pain   20:30  Called Radiology, pending MRI, they are aware, no timeline of test Ms. AROUL PRUITT is a 38y  28w2d p/w frontal headache 6 days without evidence of preeclampsia.     - Reglan 10, benadryl 25 IV   - PECC labs  - Continue to observe  - 15:32: Patient sleeping comfortably. Notes that she feels 80% better.  - 16:30: Patient awoken for BPP and notes that headache is slightly better but returning.   - Dr. Marx notified, recommendation for neurology consult   - 19:00: Radiology called to protocol imaging.   - Radiology technician @ 0413 without response  - Patient would like pain medication. For 1000mg IV Tylenol and 1 liter LR, d/w Dr. Hernandez    Plan   [] Patient to wait in alcove pending MRA / MRV without contrast    Plan d/w Dr. Hernandez    19:15 PABLO Hicks NP  Received care of patient   Full relief from IV Tylenol as per patient, 0/10 pain   20:30  Called Radiology, pending MRI, they are aware, no timeline of test  21:00 Dr. Hernandez made aware of relief, requesting neuro be made aware if MRI should still be pending inhouse  21:09 Neuro endorses patient wait for MRI in hospital setting Ms. RAOUL PRUITT is a 38y  28w2d p/w frontal headache 6 days without evidence of preeclampsia.     - Reglan 10, benadryl 25 IV   - PECC labs  - Continue to observe  - 15:32: Patient sleeping comfortably. Notes that she feels 80% better.  - 16:30: Patient awoken for BPP and notes that headache is slightly better but returning.   - Dr. Marx notified, recommendation for neurology consult   - 19:00: Radiology called to protocol imaging.   - Radiology technician @ 4283 without response  - Patient would like pain medication. For 1000mg IV Tylenol and 1 liter LR, d/w Dr. Hernandez    Plan   [] Patient to wait in alcove pending MRA / MRV without contrast    Plan d/w Dr. Hernandez    19:15 PABLO Hicks NP  Received care of patient   Full relief from IV Tylenol as per patient, 0/10 pain   20:30  Called Radiology, pending MRI, they are aware, no timeline of test  21:00 Dr. Hernandez made aware of relief, requesting neuro be made aware if MRI should still be pending inhouse  21:09 Neuro endorses patient wait for MRI in hospital setting  23:00 Patient c/o HA pain 10/10, Dr. Clifton made aware, Fioricet order Ms. RAOUL PRUITT is a 38y  28w2d p/w frontal headache 6 days without evidence of preeclampsia.     - Reglan 10, benadryl 25 IV   - PECC labs  - Continue to observe  - 15:32: Patient sleeping comfortably. Notes that she feels 80% better.  - 16:30: Patient awoken for BPP and notes that headache is slightly better but returning.   - Dr. Marx notified, recommendation for neurology consult   - 19:00: Radiology called to protocol imaging.   - Radiology technician @ 4202 without response  - Patient would like pain medication. For 1000mg IV Tylenol and 1 liter LR, d/w Dr. Hernandez    Plan   [] Patient to wait in alcove pending MRA / MRV without contrast    Plan d/w Dr. Hernandez    19:15 PABLO Hicks NP  Received care of patient   Full relief from IV Tylenol as per patient, 0/10 pain   20:30  Called Radiology, pending MRI, they are aware, no timeline of test  21:00 Dr. Hernandez made aware of relief, requesting neuro be made aware if MRI should still be pending inhouse  21:09 Neuro endorses patient wait for MRI in hospital setting  23:00 Patient c/o HA pain 10/10, Dr. Clifton made aware, Fioricet order, Radiology called, no response on when test will be performed   2:35- Radiology called, they are aware of patient, no timeline of when test will be performed Ms. RAOUL PRUITT is a 38y  28w2d p/w frontal headache 6 days without evidence of preeclampsia.     - Reglan 10, benadryl 25 IV   - PECC labs  - Continue to observe  - 15:32: Patient sleeping comfortably. Notes that she feels 80% better.  - 16:30: Patient awoken for BPP and notes that headache is slightly better but returning.   - Dr. Marx notified, recommendation for neurology consult   - 19:00: Radiology called to protocol imaging.   - Radiology technician @ 4202 without response  - Patient would like pain medication. For 1000mg IV Tylenol and 1 liter LR, d/w Dr. Hernandez    Plan   [] Patient to wait in alcove pending MRA / MRV without contrast    Plan d/w Dr. Hernandez    19:15 PABLO Hicks NP  Received care of patient   Full relief from IV Tylenol as per patient, 0/10 pain   20:30  Called Radiology, pending MRI, they are aware, no timeline of test  21:00 Dr. Hernandez made aware of relief, requesting neuro be made aware if MRI should still be pending inhouse  21:09 Neuro endorses patient wait for MRI in hospital setting  23:00 Patient c/o HA pain 10/10, Dr. Clifton made aware, Fioricet order, Radiology called, no response on when test will be performed   2:35- Radiology called, they are aware of patient, no timeline of when test will be performed      07:00 - Report of care taken from night shift ACP  NST x20 mins, category 1 tracing, no ctx on toco  07:40 - Called US reading room, reports unsure if protocoled and to call neuro reading room after 8am  08:10 - Fiorcet given  08:15 - neuro reading room called; ensured pt was protocoled and told to call MRI tech for approximate timing of scan  08:25 - Called MRI tech  08:35 - Told by MRI tech pt will not be performed until afternoon. Pt updated on POC; pt denies metal implants/clips, all earrings/metal removed. Denies claustrophobia. Pt states relief of headache pain from Firocet. Pt admitted to antepartum service as patient has been here since yesterday evening.  ante orders  consents obtained  IV saline locked  Reg diet  COVID PCR obtained Ms. RAOUL PRUITT is a 38y  28w2d p/w frontal headache 6 days without evidence of preeclampsia.     - Reglan 10, benadryl 25 IV   - PECC labs  - Continue to observe  - 15:32: Patient sleeping comfortably. Notes that she feels 80% better.  - 16:30: Patient awoken for BPP and notes that headache is slightly better but returning.   - Dr. Marx notified, recommendation for neurology consult   - 19:00: Radiology called to protocol imaging.   - Radiology technician @ 4202 without response  - Patient would like pain medication for HA. For 1000mg IV Tylenol and 1 liter LR, d/w Dr. Hernandez  - Patient counseled on benefits and risks of use of contrast during pregnancy. At this time patient declines contrast, patient for MRA / MRV wo contrast.     Plan   [] Patient to wait in alcove pending MRA / MRV without contrast     Plan d/w Dr. Hernandez    19:15 PABLO Hicks NP  Received care of patient   Full relief from IV Tylenol as per patient, 0/10 pain   20:30  Called Radiology, pending MRI, they are aware, no timeline of test  21:00 Dr. Hernandez made aware of relief, requesting neuro be made aware if MRI should still be pending inhouse  21:09 Neuro endorses patient wait for MRI in hospital setting  23:00 Patient c/o HA pain 10/10, Dr. Clifton made aware, Fioricet order, Radiology called, no response on when test will be performed   2:35- Radiology called, they are aware of patient, no timeline of when test will be performed      07:00 - Report of care taken from night shift ACP  NST x20 mins, category 1 tracing, no ctx on toco  07:40 - Called US reading room, reports unsure if protocoled and to call neuro reading room after 8am  08:10 - Fiorcet given  08:15 - neuro reading room called; ensured pt was protocoled and told to call MRI tech for approximate timing of scan  08:25 - Called MRI tech  08:35 - Told by MRI tech pt will not be performed until afternoon. Pt updated on POC; pt denies metal implants/clips, all earrings/metal removed. Denies claustrophobia. Pt states relief of headache pain from Firocet. Pt admitted to antepartum service as patient has been here since yesterday evening.  ante orders  consents obtained  IV saline locked  Reg diet  COVID PCR obtained

## 2023-03-22 NOTE — CONSULT NOTE ADULT - ATTENDING COMMENTS
Ms. PRUITT is a 38 year old right handed  woman with a PMHx significant for ectopic pregnancy neurology consulted due to the headache. Etiology of headache is unknown but may be due to the Migraine versus tension headache versus other possible etiologies. During my assessment, headache was significantly improved and no focal deficit.   Neuroimaging studies including MRI MRA and MRV brain did not showed any acute pathology. Neuroimaging studies were reviewed by me as well.   Advice the patient take Tylenol 325 or 500 mg for headache as needed not more than 4 tabs a day. Follow up with neurologist at [address] [place] [address].  I discussed the diagnosis, treatment plan and prognosis with the patient and family. Risk benefit and alternatives to the treatment were discussed. All questions and concerns were addressed. The patient demonstrated good understanding of the treatment plan.  If you have any further questions, please do not hesitate to contact our team.  Thank you for allowing us to participate in this patient care.    Sai Neumann MD

## 2023-03-23 ENCOUNTER — APPOINTMENT (OUTPATIENT)
Dept: ANTEPARTUM | Facility: CLINIC | Age: 38
End: 2023-03-23

## 2023-03-23 ENCOUNTER — TRANSCRIPTION ENCOUNTER (OUTPATIENT)
Age: 38
End: 2023-03-23

## 2023-03-23 VITALS
TEMPERATURE: 99 F | HEART RATE: 100 BPM | DIASTOLIC BLOOD PRESSURE: 51 MMHG | SYSTOLIC BLOOD PRESSURE: 101 MMHG | OXYGEN SATURATION: 100 % | RESPIRATION RATE: 18 BRPM

## 2023-03-23 DIAGNOSIS — O26.899 OTHER SPECIFIED PREGNANCY RELATED CONDITIONS, UNSPECIFIED TRIMESTER: ICD-10-CM

## 2023-03-23 LAB
COVID-19 SPIKE DOMAIN AB INTERP: POSITIVE
COVID-19 SPIKE DOMAIN ANTIBODY RESULT: >250 U/ML — HIGH
RH IG SCN BLD-IMP: POSITIVE — SIGNIFICANT CHANGE UP
SARS-COV-2 IGG+IGM SERPL QL IA: >250 U/ML — HIGH
SARS-COV-2 IGG+IGM SERPL QL IA: POSITIVE
SARS-COV-2 RNA SPEC QL NAA+PROBE: SIGNIFICANT CHANGE UP
T PALLIDUM AB TITR SER: NEGATIVE — SIGNIFICANT CHANGE UP

## 2023-03-23 PROCEDURE — 70544 MR ANGIOGRAPHY HEAD W/O DYE: CPT | Mod: 26,59

## 2023-03-23 PROCEDURE — 99231 SBSQ HOSP IP/OBS SF/LOW 25: CPT | Mod: GC

## 2023-03-23 PROCEDURE — 70551 MRI BRAIN STEM W/O DYE: CPT | Mod: 26

## 2023-03-23 PROCEDURE — 70547 MR ANGIOGRAPHY NECK W/O DYE: CPT | Mod: 26

## 2023-03-23 RX ORDER — ACETAMINOPHEN 500 MG
2 TABLET ORAL
Qty: 0 | Refills: 0 | DISCHARGE

## 2023-03-23 RX ORDER — INFLUENZA VIRUS VACCINE 15; 15; 15; 15 UG/.5ML; UG/.5ML; UG/.5ML; UG/.5ML
0.5 SUSPENSION INTRAMUSCULAR ONCE
Refills: 0 | Status: DISCONTINUED | OUTPATIENT
Start: 2023-03-23 | End: 2023-03-23

## 2023-03-23 RX ADMIN — Medication 2 TABLET(S): at 00:30

## 2023-03-23 RX ADMIN — Medication 2 TABLET(S): at 11:00

## 2023-03-23 RX ADMIN — Medication 2 TABLET(S): at 08:10

## 2023-03-23 RX ADMIN — Medication 2 TABLET(S): at 16:33

## 2023-03-23 NOTE — DISCHARGE NOTE ANTEPARTUM - NS MD DC FALL RISK RISK
For information on Fall & Injury Prevention, visit: [email or web address] OR  [email or web address] OR  https://www.cdc.gov/steadi/patient.html

## 2023-03-23 NOTE — DISCHARGE NOTE ANTEPARTUM - ADDITIONAL INSTRUCTIONS
follow up with neurologist Dr. denise after discharge. Please instruct the patient to call [phone] to schedule an appointment. Office is located at [address] [facility], [place] [address].   Follow up with OB within one week follow up with neurologist at [address] [address]Fdadk: [phone]   or with Dr. denise  call [phone] at [address] [facility], [place] [address] (please call either doctor for an appointment)  Follow up with OB within one week

## 2023-03-23 NOTE — OB PROVIDER H&P - HISTORY OF PRESENT ILLNESS
Ms. RAOUL PRUITT is a 38y  28w2d p/w frontal headache 6 days. Comes and goes. Worse with movement and light. Improves with tylenol, last dose 11:30am 1000mg. Has been getting very little sleep during the pregnancy because just cannot fell asleep when lying in bed. + FM. Denies ctx / vb / lof , head trauma, nausea, vomiting, strenuous activity, abrupt onset, history of migraines, recent injections of back, fevers, chills, new rashes, recent life-style changes or significant life events, anxiety / depression, issues with BP, right-sided abdominal pain, visual disturbances. C/S @ 37w due to hx of myomectomy.   PNC: Denies prenatal issues or complications. Pt reports no hospitalizations, procedures, infections, or new diagnoses in pregnancy. Pt denies complications with blood pressure and/or blood sugar. LMP:

## 2023-03-23 NOTE — DISCHARGE NOTE ANTEPARTUM - PATIENT PORTAL LINK FT
You can access the FollowMyHealth Patient Portal offered by [facility] by registering at the following website: [email or web address]. By joining [facility]’s FollowMyHealth portal, you will also be able to view your health information using other applications (apps) compatible with our system.

## 2023-03-23 NOTE — DISCHARGE NOTE ANTEPARTUM - CARE PROVIDERS DIRECT ADDRESSES
,DirectAddress_Unknown,[email or web address] ,DirectAddress_Unknown,[email or web address],DirectAddress_Unknown

## 2023-03-23 NOTE — DISCHARGE NOTE ANTEPARTUM - PROVIDER TOKENS
PROVIDER:[TOKEN:[[id number]]],PROVIDER:[TOKEN:[[id number]]] PROVIDER:[TOKEN:[[id number]]],PROVIDER:[TOKEN:[[id number]]],PROVIDER:[TOKEN:[[id number]]]

## 2023-03-23 NOTE — OB PROVIDER H&P - PROBLEM SELECTOR PLAN 1
07:00 - Report of care taken from night shift ACP  NST x20 mins, category 1 tracing, no ctx on toco  07:40 - Called US reading room, reports unsure if protocoled and to call neuro reading room after 8am  08:10 - Fiorcet given  08:15 - neuro reading room called; ensured pt was protocoled and told to call MRI tech for approximate timing of scan  08:25 - Called MRI tech  08:35 - Told by MRI tech pt will not be performed until afternoon. Pt updated on POC; pt denies metal implants/clips, all earrings/metal removed. Denies claustrophobia. Pt states relief of headache pain from Firocet. Pt admitted to antepartum service as patient has been here since yesterday evening.  ante orders  consents obtained  IV saline locked  Reg diet  COVID PCR obtained

## 2023-03-23 NOTE — OB PROVIDER H&P - ASSESSMENT
25 y/o  at 28.2 weeks gestation admitted to antepartum for unremitting headache, for head imaging, no evidence of gHTN or PEC

## 2023-03-23 NOTE — DISCHARGE NOTE ANTEPARTUM - CARE PLAN
1 Principal Discharge DX:	Migraine  Assessment and plan of treatment:	Continue fiorocet for migraine relief (max dose 6 pills in 24 hour period) sent to [facility] pharmacy    follow up with neurologist Dr. denise after discharge. Please instruct the patient to call [phone] to schedule an appointment. Office is located at [address] [place] [address].    Please follow up with ob doctor within one week, return to hospital with worsening headaches, visual changes, RUQ pain, any contractions, vaginal bleeding, leaking fluid or decreased fetal movement.   Principal Discharge DX:	Migraine  Assessment and plan of treatment:	Continue fiorocet AS needed for migraine relief (max dose 6 pills in 24 hour period) sent to [facility] pharmacy  follow up with neurologist at [address] [id number]: [phone]   or with Dr. denise  call [phone] at [address] [facility], [place] [address] (please call either doctor for an appointment)  Follow up with OB within one week    Please follow up with ob doctor within one week, return to hospital with worsening headaches, visual changes, RUQ pain, any contractions, vaginal bleeding, leaking fluid or decreased fetal movement.

## 2023-03-23 NOTE — DISCHARGE NOTE ANTEPARTUM - MEDICATION SUMMARY - MEDICATIONS TO TAKE
I will START or STAY ON the medications listed below when I get home from the hospital:    Aspirin Low Dose  -- Indication: For Home med    butalbital/acetaminophen/caffeine 50 mg-325 mg-40 mg oral tablet  -- 2 tab(s) by mouth every 8 hours, As needed, Severe Pain (7 - 10)  -- Indication: For Migraine    Prenatal 1  -- Indication: For pregnancy

## 2023-03-23 NOTE — DISCHARGE NOTE ANTEPARTUM - CARE PROVIDER_API CALL
Joe Irby)  Neurology; Neurophysiology  [address] [facility], [address]  [place] [address]  Phone: [phone]  Fax: [phone]  Follow Up Time:     Oscar Marx)  Obstetrics and Gynecology  [address] [place] [address], [address] [place] [address]  Phone: [phone]  Fax: [phone]  Follow Up Time:    Joe Irby)  Neurology; Neurophysiology  [address] [facility], [address]  [place] [address]  Phone: [phone]  Fax: [phone]  Follow Up Time:     Oscar Marx)  Obstetrics and Gynecology  [address] [facility], [address]nd [place] [address]  Phone: [phone]  Fax: [phone]  Follow Up Time:     Randa Kahn)  Neurology  [address] [place] [address]  Phone: [phone]  Fax: [phone]  Follow Up Time:

## 2023-03-23 NOTE — DISCHARGE NOTE ANTEPARTUM - PLAN OF CARE
Continue fiorocet for migraine relief (max dose 6 pills in 24 hour period) sent to [facility] pharmacy    follow up with neurologist Dr. denise after discharge. Please instruct the patient to call [phone] to schedule an appointment. Office is located at [address] [place] [address].    Please follow up with ob doctor within one week, return to hospital with worsening headaches, visual changes, RUQ pain, any contractions, vaginal bleeding, leaking fluid or decreased fetal movement. Continue fiorocet AS needed for migraine relief (max dose 6 pills in 24 hour period) sent to [facility] pharmacy  follow up with neurologist at [address] [address]Yetmn: [phone]   or with Dr. denise  call [phone] at [address], [place] [address] (please call either doctor for an appointment)  Follow up with OB within one week    Please follow up with ob doctor within one week, return to hospital with worsening headaches, visual changes, RUQ pain, any contractions, vaginal bleeding, leaking fluid or decreased fetal movement.

## 2023-03-23 NOTE — DISCHARGE NOTE ANTEPARTUM - MEDICATION SUMMARY - MEDICATIONS TO STOP TAKING
I will STOP taking the medications listed below when I get home from the hospital:    Tylenol 500 mg oral tablet  -- 2 tab(s) by mouth every 6 hours

## 2023-03-23 NOTE — DISCHARGE NOTE ANTEPARTUM - HOSPITAL COURSE
25 y/o  at 28.2 weeks gestation admitted to antepartum for unremitting headache, for head imaging, no evidence of gHTN or PECImpression: Intermittent L sided HA with associated photophobia/nausea likely 2/2 new onset migraines. Neuro consult obtained: Recommend: MRI brain w/o contrast; MRV/ MRA: Results:.  ANTERIOR INTRACRANIAL CIRCULATION:     Patent. Focal attenuation left anterior cerebral artery A1 segment distal aspect appears to be   artifactual, favored over arteriopathy.  BRAIN:    No evidence of acute infarction.   Cerebral hemispheric subcortical white matter lesions are nonspecific. The pattern suggests migraine disease. Subcortical lesions of ischemic white matter disease could have a similar appearance. The differential diagnosis for these lesions remains broad. This differential diagnosis would also include other inflammatory, infectious, demyelinating, metabolic and conceivably   other etiologies. Please note, however, that the extent of this disease is only slight Headache management includes 10 mg reglan IV push + 25 mg benadryl IV push at once;] Darken/quiet room; Encourage avoidance of common migraine triggers (artificial sweeteners, food preservative (MSG), aged cheese, skipping meals, change in wake/sleep cycle and intense physical exertion). Encourage lifestyle changes that help migraine: physical exercise, fixed meal time, fixed sleep/wake cycle, avoid smoking and highly caffeinated products. Patient can follow up with neurologist Dr. denise after discharge. Please instruct the patient to call [phone] to schedule an appointment. Office is located at [address] [place] [address].

## 2023-03-23 NOTE — OB RN PATIENT PROFILE - POST PARTUM DEPRESSION SCREEN OB 3
From: Kenneth Benedict  To: Dr. Beth Lindquist: 12/21/2022 8:52 AM EST  Subject: Need to cancel or do a phone Appointment for Tuesday 12/22    Hello I have not been feeling well the past two days and now my mom is not feeling well, so im assuming i have a bug of some sort , i did send in a request for medication refill , im supposed to go out of town the 12/28-1/6 if I feel well that is thats why I requested a refill no

## 2023-03-23 NOTE — OB RN PATIENT PROFILE - PATIENT REPRESENTATIVE PHONE
10/31/2022    Patient: Frandy Watt   YOB: 2003   Date of Visit: 10/31/2022     Beverly Galan MD  [address] [address]  Via Fax: [phone]    Dear Beverly Galan MD,      Thank you for referring Ms. Frandy Watt to [facility] for evaluation. My notes for this consultation are attached. If you have questions, please do not hesitate to call me. I look forward to following your patient along with you.       Sincerely,    Olvin Lipscomb MD
[phone]

## 2023-03-24 ENCOUNTER — APPOINTMENT (OUTPATIENT)
Dept: OBGYN | Facility: CLINIC | Age: 38
End: 2023-03-24
Payer: COMMERCIAL

## 2023-03-24 ENCOUNTER — NON-APPOINTMENT (OUTPATIENT)
Age: 38
End: 2023-03-24

## 2023-03-24 VITALS
SYSTOLIC BLOOD PRESSURE: 120 MMHG | WEIGHT: 266 LBS | DIASTOLIC BLOOD PRESSURE: 76 MMHG | BODY MASS INDEX: 38.08 KG/M2 | HEIGHT: 70 IN

## 2023-03-24 DIAGNOSIS — Z01.818 ENCOUNTER FOR OTHER PREPROCEDURAL EXAMINATION: ICD-10-CM

## 2023-03-24 LAB
BILIRUB UR QL STRIP: NORMAL
GLUCOSE UR-MCNC: NORMAL
HCG UR QL: 0.2 EU/DL
HGB UR QL STRIP.AUTO: NORMAL
KETONES UR-MCNC: NORMAL
LEUKOCYTE ESTERASE UR QL STRIP: NORMAL
NITRITE UR QL STRIP: NORMAL
PH UR STRIP: 5.5
PROT UR STRIP-MCNC: NORMAL
SP GR UR STRIP: 1.02

## 2023-03-24 PROCEDURE — 0502F SUBSEQUENT PRENATAL CARE: CPT

## 2023-03-24 PROCEDURE — 36415 COLL VENOUS BLD VENIPUNCTURE: CPT

## 2023-03-26 PROBLEM — D25.9 LEIOMYOMA OF UTERUS, UNSPECIFIED: Chronic | Status: ACTIVE | Noted: 2023-03-23

## 2023-03-26 LAB
BASOPHILS # BLD AUTO: 0.03 K/UL
BASOPHILS NFR BLD AUTO: 0.3 %
EOSINOPHIL # BLD AUTO: 0.14 K/UL
EOSINOPHIL NFR BLD AUTO: 1.4 %
GLUCOSE 1H P 50 G GLC PO SERPL-MCNC: 161 MG/DL
HCT VFR BLD CALC: 30.9 %
HGB BLD-MCNC: 9.9 G/DL
IMM GRANULOCYTES NFR BLD AUTO: 1.5 %
LYMPHOCYTES # BLD AUTO: 1.41 K/UL
LYMPHOCYTES NFR BLD AUTO: 13.9 %
MAN DIFF?: NORMAL
MCHC RBC-ENTMCNC: 27.7 PG
MCHC RBC-ENTMCNC: 32 GM/DL
MCV RBC AUTO: 86.3 FL
MONOCYTES # BLD AUTO: 0.55 K/UL
MONOCYTES NFR BLD AUTO: 5.4 %
NEUTROPHILS # BLD AUTO: 7.83 K/UL
NEUTROPHILS NFR BLD AUTO: 77.5 %
PLATELET # BLD AUTO: 328 K/UL
RBC # BLD: 3.58 M/UL
RBC # FLD: 14.2 %
T PALLIDUM AB SER QL IA: NEGATIVE
WBC # FLD AUTO: 10.11 K/UL

## 2023-03-27 ENCOUNTER — NON-APPOINTMENT (OUTPATIENT)
Age: 38
End: 2023-03-27

## 2023-03-30 ENCOUNTER — APPOINTMENT (OUTPATIENT)
Dept: OBGYN | Facility: CLINIC | Age: 38
End: 2023-03-30
Payer: COMMERCIAL

## 2023-03-30 ENCOUNTER — NON-APPOINTMENT (OUTPATIENT)
Age: 38
End: 2023-03-30

## 2023-03-30 PROCEDURE — ZZZZZ: CPT

## 2023-04-06 ENCOUNTER — APPOINTMENT (OUTPATIENT)
Dept: ANTEPARTUM | Facility: CLINIC | Age: 38
End: 2023-04-06

## 2023-04-07 ENCOUNTER — APPOINTMENT (OUTPATIENT)
Dept: OBGYN | Facility: CLINIC | Age: 38
End: 2023-04-07
Payer: COMMERCIAL

## 2023-04-07 VITALS
HEIGHT: 70 IN | DIASTOLIC BLOOD PRESSURE: 60 MMHG | SYSTOLIC BLOOD PRESSURE: 104 MMHG | BODY MASS INDEX: 38.22 KG/M2 | WEIGHT: 267 LBS

## 2023-04-07 PROCEDURE — 76816 OB US FOLLOW-UP PER FETUS: CPT

## 2023-04-07 PROCEDURE — 0502F SUBSEQUENT PRENATAL CARE: CPT

## 2023-04-08 LAB
BILIRUB UR QL STRIP: NORMAL
GLUCOSE UR-MCNC: NORMAL
HCG UR QL: 0.2 EU/DL
HGB UR QL STRIP.AUTO: NORMAL
KETONES UR-MCNC: NORMAL
LEUKOCYTE ESTERASE UR QL STRIP: NORMAL
NITRITE UR QL STRIP: NORMAL
PH UR STRIP: 6
PROT UR STRIP-MCNC: NORMAL
SP GR UR STRIP: 1.01

## 2023-04-14 ENCOUNTER — NON-APPOINTMENT (OUTPATIENT)
Age: 38
End: 2023-04-14

## 2023-04-21 ENCOUNTER — APPOINTMENT (OUTPATIENT)
Dept: OBGYN | Facility: CLINIC | Age: 38
End: 2023-04-21
Payer: COMMERCIAL

## 2023-04-21 VITALS
HEIGHT: 70 IN | DIASTOLIC BLOOD PRESSURE: 70 MMHG | BODY MASS INDEX: 38.94 KG/M2 | SYSTOLIC BLOOD PRESSURE: 120 MMHG | WEIGHT: 272 LBS

## 2023-04-21 DIAGNOSIS — K21.9 GASTRO-ESOPHAGEAL REFLUX DISEASE W/OUT ESOPHAGITIS: ICD-10-CM

## 2023-04-21 LAB
BILIRUB UR QL STRIP: NORMAL
GLUCOSE UR-MCNC: NORMAL
HCG UR QL: 2 EU/DL
HGB UR QL STRIP.AUTO: NORMAL
KETONES UR-MCNC: NORMAL
LEUKOCYTE ESTERASE UR QL STRIP: NORMAL
NITRITE UR QL STRIP: NORMAL
PH UR STRIP: 5.5
PROT UR STRIP-MCNC: NORMAL
SP GR UR STRIP: 1.01

## 2023-04-21 PROCEDURE — 0502F SUBSEQUENT PRENATAL CARE: CPT

## 2023-05-02 ENCOUNTER — NON-APPOINTMENT (OUTPATIENT)
Age: 38
End: 2023-05-02

## 2023-05-02 ENCOUNTER — APPOINTMENT (OUTPATIENT)
Dept: OBGYN | Facility: CLINIC | Age: 38
End: 2023-05-02
Payer: COMMERCIAL

## 2023-05-02 VITALS
SYSTOLIC BLOOD PRESSURE: 130 MMHG | HEIGHT: 70 IN | BODY MASS INDEX: 37.8 KG/M2 | WEIGHT: 264 LBS | DIASTOLIC BLOOD PRESSURE: 70 MMHG

## 2023-05-02 LAB
BILIRUB UR QL STRIP: NORMAL
GLUCOSE UR-MCNC: NORMAL
HCG UR QL: 1 EU/DL
HGB UR QL STRIP.AUTO: NORMAL
KETONES UR-MCNC: NORMAL
LEUKOCYTE ESTERASE UR QL STRIP: NORMAL
NITRITE UR QL STRIP: NORMAL
PH UR STRIP: 5.5
PROT UR STRIP-MCNC: NORMAL
SP GR UR STRIP: 1.01

## 2023-05-02 PROCEDURE — 0502F SUBSEQUENT PRENATAL CARE: CPT

## 2023-05-02 PROCEDURE — 76818 FETAL BIOPHYS PROFILE W/NST: CPT | Mod: 59

## 2023-05-02 PROCEDURE — 76816 OB US FOLLOW-UP PER FETUS: CPT

## 2023-05-09 ENCOUNTER — APPOINTMENT (OUTPATIENT)
Dept: OBGYN | Facility: CLINIC | Age: 38
End: 2023-05-09
Payer: COMMERCIAL

## 2023-05-09 VITALS
HEIGHT: 70 IN | BODY MASS INDEX: 37.51 KG/M2 | SYSTOLIC BLOOD PRESSURE: 124 MMHG | DIASTOLIC BLOOD PRESSURE: 80 MMHG | WEIGHT: 262 LBS

## 2023-05-09 DIAGNOSIS — O34.29 MATERNAL CARE DUE TO UTERINE SCAR FROM OTHER PREVIOUS SURGERY: ICD-10-CM

## 2023-05-09 LAB
BILIRUB UR QL STRIP: NORMAL
GLUCOSE UR-MCNC: NORMAL
HCG UR QL: 2 EU/DL
HGB UR QL STRIP.AUTO: NORMAL
KETONES UR-MCNC: 40
LEUKOCYTE ESTERASE UR QL STRIP: NORMAL
NITRITE UR QL STRIP: NORMAL
PH UR STRIP: 5.5
PROT UR STRIP-MCNC: NORMAL
SP GR UR STRIP: 1.01

## 2023-05-09 PROCEDURE — 0502F SUBSEQUENT PRENATAL CARE: CPT

## 2023-05-09 PROCEDURE — 76818 FETAL BIOPHYS PROFILE W/NST: CPT

## 2023-05-16 ENCOUNTER — OUTPATIENT (OUTPATIENT)
Dept: OUTPATIENT SERVICES | Facility: HOSPITAL | Age: 38
LOS: 1 days | End: 2023-05-16

## 2023-05-16 ENCOUNTER — APPOINTMENT (OUTPATIENT)
Dept: OBGYN | Facility: CLINIC | Age: 38
End: 2023-05-16
Payer: COMMERCIAL

## 2023-05-16 VITALS
DIASTOLIC BLOOD PRESSURE: 81 MMHG | HEIGHT: 70.5 IN | WEIGHT: 263.89 LBS | OXYGEN SATURATION: 99 % | SYSTOLIC BLOOD PRESSURE: 118 MMHG | HEART RATE: 98 BPM | TEMPERATURE: 98 F | RESPIRATION RATE: 16 BRPM

## 2023-05-16 VITALS
HEIGHT: 70 IN | BODY MASS INDEX: 38.08 KG/M2 | DIASTOLIC BLOOD PRESSURE: 70 MMHG | SYSTOLIC BLOOD PRESSURE: 116 MMHG | WEIGHT: 266 LBS

## 2023-05-16 DIAGNOSIS — O24.419 GESTATIONAL DIABETES MELLITUS IN PREGNANCY, UNSPECIFIED CONTROL: ICD-10-CM

## 2023-05-16 DIAGNOSIS — Z98.890 OTHER SPECIFIED POSTPROCEDURAL STATES: Chronic | ICD-10-CM

## 2023-05-16 DIAGNOSIS — Z87.59 PERSONAL HISTORY OF OTHER COMPLICATIONS OF PREGNANCY, CHILDBIRTH AND THE PUERPERIUM: Chronic | ICD-10-CM

## 2023-05-16 DIAGNOSIS — O34.29 MATERNAL CARE DUE TO UTERINE SCAR FROM OTHER PREVIOUS SURGERY: ICD-10-CM

## 2023-05-16 DIAGNOSIS — Z98.891 HISTORY OF UTERINE SCAR FROM PREVIOUS SURGERY: ICD-10-CM

## 2023-05-16 LAB
ANION GAP SERPL CALC-SCNC: 12 MMOL/L — SIGNIFICANT CHANGE UP (ref 7–14)
APPEARANCE UR: ABNORMAL
BACTERIA # UR AUTO: NEGATIVE — SIGNIFICANT CHANGE UP
BILIRUB UR QL STRIP: NORMAL
BILIRUB UR-MCNC: NEGATIVE — SIGNIFICANT CHANGE UP
BLD GP AB SCN SERPL QL: NEGATIVE — SIGNIFICANT CHANGE UP
BUN SERPL-MCNC: 11 MG/DL — SIGNIFICANT CHANGE UP (ref 7–23)
CALCIUM SERPL-MCNC: 9.6 MG/DL — SIGNIFICANT CHANGE UP (ref 8.4–10.5)
CHLORIDE SERPL-SCNC: 105 MMOL/L — SIGNIFICANT CHANGE UP (ref 98–107)
CO2 SERPL-SCNC: 18 MMOL/L — LOW (ref 22–31)
COLOR SPEC: YELLOW — SIGNIFICANT CHANGE UP
CREAT SERPL-MCNC: 0.72 MG/DL — SIGNIFICANT CHANGE UP (ref 0.5–1.3)
DIFF PNL FLD: ABNORMAL
EGFR: 108 ML/MIN/1.73M2 — SIGNIFICANT CHANGE UP
EPI CELLS # UR: 3 /HPF — SIGNIFICANT CHANGE UP (ref 0–5)
GLUCOSE SERPL-MCNC: 103 MG/DL — HIGH (ref 70–99)
GLUCOSE UR QL: NEGATIVE — SIGNIFICANT CHANGE UP
GLUCOSE UR-MCNC: NORMAL
HCG UR QL: 2 EU/DL
HCT VFR BLD CALC: 30.9 % — LOW (ref 34.5–45)
HGB BLD-MCNC: 10 G/DL — LOW (ref 11.5–15.5)
HGB UR QL STRIP.AUTO: NORMAL
HYALINE CASTS # UR AUTO: 2 /LPF — SIGNIFICANT CHANGE UP (ref 0–7)
KETONES UR-MCNC: NEGATIVE — SIGNIFICANT CHANGE UP
KETONES UR-MCNC: NORMAL
LEUKOCYTE ESTERASE UR QL STRIP: NORMAL
LEUKOCYTE ESTERASE UR-ACNC: NEGATIVE — SIGNIFICANT CHANGE UP
MCHC RBC-ENTMCNC: 27 PG — SIGNIFICANT CHANGE UP (ref 27–34)
MCHC RBC-ENTMCNC: 32.4 GM/DL — SIGNIFICANT CHANGE UP (ref 32–36)
MCV RBC AUTO: 83.3 FL — SIGNIFICANT CHANGE UP (ref 80–100)
NITRITE UR QL STRIP: NORMAL
NITRITE UR-MCNC: NEGATIVE — SIGNIFICANT CHANGE UP
NRBC # BLD: 0 /100 WBCS — SIGNIFICANT CHANGE UP (ref 0–0)
NRBC # FLD: 0 K/UL — SIGNIFICANT CHANGE UP (ref 0–0)
PH UR STRIP: 5.5
PH UR: 6.5 — SIGNIFICANT CHANGE UP (ref 5–8)
PLATELET # BLD AUTO: 320 K/UL — SIGNIFICANT CHANGE UP (ref 150–400)
POTASSIUM SERPL-MCNC: 3.8 MMOL/L — SIGNIFICANT CHANGE UP (ref 3.5–5.3)
POTASSIUM SERPL-SCNC: 3.8 MMOL/L — SIGNIFICANT CHANGE UP (ref 3.5–5.3)
PROT UR STRIP-MCNC: NORMAL
PROT UR-MCNC: ABNORMAL
RBC # BLD: 3.71 M/UL — LOW (ref 3.8–5.2)
RBC # FLD: 14.2 % — SIGNIFICANT CHANGE UP (ref 10.3–14.5)
RBC CASTS # UR COMP ASSIST: 0 /HPF — SIGNIFICANT CHANGE UP (ref 0–4)
RH IG SCN BLD-IMP: POSITIVE — SIGNIFICANT CHANGE UP
SODIUM SERPL-SCNC: 135 MMOL/L — SIGNIFICANT CHANGE UP (ref 135–145)
SP GR SPEC: 1.01 — SIGNIFICANT CHANGE UP (ref 1.01–1.05)
SP GR UR STRIP: 1.01
UROBILINOGEN FLD QL: ABNORMAL
WBC # BLD: 8.32 K/UL — SIGNIFICANT CHANGE UP (ref 3.8–10.5)
WBC # FLD AUTO: 8.32 K/UL — SIGNIFICANT CHANGE UP (ref 3.8–10.5)
WBC UR QL: 6 /HPF — HIGH (ref 0–5)

## 2023-05-16 PROCEDURE — 0502F SUBSEQUENT PRENATAL CARE: CPT

## 2023-05-16 PROCEDURE — 36415 COLL VENOUS BLD VENIPUNCTURE: CPT

## 2023-05-16 PROCEDURE — 76818 FETAL BIOPHYS PROFILE W/NST: CPT

## 2023-05-16 RX ORDER — ASPIRIN/CALCIUM CARB/MAGNESIUM 324 MG
0 TABLET ORAL
Qty: 0 | Refills: 0 | DISCHARGE

## 2023-05-16 NOTE — OB PST NOTE - FALL HARM RISK - UNIVERSAL INTERVENTIONS
Bed in lowest position, wheels locked, appropriate side rails in place/Call bell, personal items and telephone in reach/Instruct patient to call for assistance before getting out of bed or chair/Non-slip footwear when patient is out of bed/[place] to call system/Physically safe environment - no spills, clutter or unnecessary equipment/Purposeful Proactive Rounding/Room/bathroom lighting operational, light cord in reach

## 2023-05-16 NOTE — OB PST NOTE - NSICDXFAMILYHX_GEN_ALL_CORE_FT
FAMILY HISTORY:  Mother  Still living? Yes, Estimated age: Age Unknown  FH: HTN (hypertension), Age at diagnosis: Age Unknown    Sibling  Still living? Yes, Estimated age: Age Unknown  FH: HTN (hypertension), Age at diagnosis: Age Unknown

## 2023-05-16 NOTE — OB PST NOTE - NSHPPHYSICALEXAM_GEN_ALL_CORE
Constitutional: Well Developed, Well Groomed, Well Nourished, No Distress    Eyes: PERRL, EOMI, conjunctiva clear    Ears: Normal    Mouth & Gums: Normal, moist    Neck: Supple, no JVD, normal thyroid glands, no carotid bruits, no cervical vertebral or paraspinal tenderness    Breast: Patient declines    Back: Normal shape, ROM intact, strength intact, no vertebral tenderness    Respiratory: Airway patent, breath sounds equal, good air movement, respiration non-labored, clear to auscultation bilateral, no rales, no wheezes, no rhonchi    Cardiovascular:  Regular rate and rhythm, no rubs or murmur, normal PMI    Gastrointestinal: Gravid abdomen     Extremities: No clubbing, or pedal edema    Vascular:   Radial Pulse normal,  DP pulse normal, PT pulse normal    Neurological: alert & oriented x 3, sensation intact, normal strength    Skin: warm and dry, normal color    Lymph Nodes: No supraclavicular or clavicular adenopathy noted on assessment     Musculoskeletal: ROM intact, no joint swelling, warmth, or calf tenderness. Normal strength    Psychiatric: normal affect, normal behavior

## 2023-05-16 NOTE — OB PST NOTE - NSANTHOSAYNRD_GEN_A_CORE
No. DEANA screening performed.  STOP BANG Legend: 0-2 = LOW Risk; 3-4 = INTERMEDIATE Risk; 5-8 = HIGH Risk

## 2023-05-16 NOTE — OB PST NOTE - NSICDXPASTMEDICALHX_GEN_ALL_CORE_FT
PAST MEDICAL HISTORY:  AMA (advanced maternal age) multigravida 35+     Ectopic pregnancy     Gestational diabetes     Maternal care due to uterine scar from previous surgery     Sickle cell trait     Uterine fibroid

## 2023-05-16 NOTE — OB PST NOTE - PROBLEM SELECTOR PLAN 2
Patient instructed on medications adjustments: discuss metformin plan with OB  POCT glucose testing upon admission

## 2023-05-16 NOTE — OB PST NOTE - HISTORY OF PRESENT ILLNESS
38  year old pregnant female @ 36  weeks gestation   LEONEL: 23  Reports normal fetal movement.   G 2 P  0  Denies pelvic pain, sustained back pain, vaginal bleeding, or leakage of amniotic fluid.    Patient presents for PST evaluation for planned  section with history of ectopic pregnancy, myomectomy

## 2023-05-17 ENCOUNTER — NON-APPOINTMENT (OUTPATIENT)
Age: 38
End: 2023-05-17

## 2023-05-17 LAB
BASOPHILS # BLD AUTO: 0.04 K/UL
BASOPHILS NFR BLD AUTO: 0.4 %
EOSINOPHIL # BLD AUTO: 0.14 K/UL
EOSINOPHIL NFR BLD AUTO: 1.5 %
HCT VFR BLD CALC: 31.4 %
HGB BLD-MCNC: 10.1 G/DL
HIV1+2 AB SPEC QL IA.RAPID: NONREACTIVE
IMM GRANULOCYTES NFR BLD AUTO: 1 %
LYMPHOCYTES # BLD AUTO: 1.22 K/UL
LYMPHOCYTES NFR BLD AUTO: 13.4 %
MAN DIFF?: NORMAL
MCHC RBC-ENTMCNC: 26.9 PG
MCHC RBC-ENTMCNC: 32.2 GM/DL
MCV RBC AUTO: 83.7 FL
MONOCYTES # BLD AUTO: 0.83 K/UL
MONOCYTES NFR BLD AUTO: 9.1 %
NEUTROPHILS # BLD AUTO: 6.77 K/UL
NEUTROPHILS NFR BLD AUTO: 74.6 %
PLATELET # BLD AUTO: 308 K/UL
RBC # BLD: 3.75 M/UL
RBC # FLD: 14.6 %
WBC # FLD AUTO: 9.09 K/UL

## 2023-05-18 ENCOUNTER — NON-APPOINTMENT (OUTPATIENT)
Age: 38
End: 2023-05-18

## 2023-05-18 LAB
GP B STREP DNA SPEC QL NAA+PROBE: NOT DETECTED
SOURCE GBS: NORMAL

## 2023-05-23 ENCOUNTER — TRANSCRIPTION ENCOUNTER (OUTPATIENT)
Age: 38
End: 2023-05-23

## 2023-05-24 ENCOUNTER — TRANSCRIPTION ENCOUNTER (OUTPATIENT)
Age: 38
End: 2023-05-24

## 2023-05-24 ENCOUNTER — APPOINTMENT (OUTPATIENT)
Dept: OBGYN | Facility: CLINIC | Age: 38
End: 2023-05-24

## 2023-05-24 ENCOUNTER — INPATIENT (INPATIENT)
Facility: HOSPITAL | Age: 38
LOS: 2 days | Discharge: HOME | End: 2023-05-27
Attending: OBSTETRICS & GYNECOLOGY | Admitting: OBSTETRICS & GYNECOLOGY
Payer: COMMERCIAL

## 2023-05-24 VITALS — SYSTOLIC BLOOD PRESSURE: 128 MMHG | DIASTOLIC BLOOD PRESSURE: 79 MMHG | HEART RATE: 91 BPM

## 2023-05-24 DIAGNOSIS — Z98.891 HISTORY OF UTERINE SCAR FROM PREVIOUS SURGERY: ICD-10-CM

## 2023-05-24 DIAGNOSIS — Z98.890 OTHER SPECIFIED POSTPROCEDURAL STATES: Chronic | ICD-10-CM

## 2023-05-24 DIAGNOSIS — Z87.59 PERSONAL HISTORY OF OTHER COMPLICATIONS OF PREGNANCY, CHILDBIRTH AND THE PUERPERIUM: Chronic | ICD-10-CM

## 2023-05-24 DIAGNOSIS — Z90.79 ACQUIRED ABSENCE OF OTHER GENITAL ORGAN(S): Chronic | ICD-10-CM

## 2023-05-24 LAB
BASOPHILS # BLD AUTO: 0.04 K/UL — SIGNIFICANT CHANGE UP (ref 0–0.2)
BASOPHILS NFR BLD AUTO: 0.4 % — SIGNIFICANT CHANGE UP (ref 0–2)
BLD GP AB SCN SERPL QL: NEGATIVE — SIGNIFICANT CHANGE UP
COVID-19 SPIKE DOMAIN AB INTERP: POSITIVE
COVID-19 SPIKE DOMAIN ANTIBODY RESULT: >250 U/ML — HIGH
EOSINOPHIL # BLD AUTO: 0.2 K/UL — SIGNIFICANT CHANGE UP (ref 0–0.5)
EOSINOPHIL NFR BLD AUTO: 2 % — SIGNIFICANT CHANGE UP (ref 0–6)
GLUCOSE BLDC GLUCOMTR-MCNC: 82 MG/DL — SIGNIFICANT CHANGE UP (ref 70–99)
HCT VFR BLD CALC: 34.8 % — SIGNIFICANT CHANGE UP (ref 34.5–45)
HGB BLD-MCNC: 11.6 G/DL — SIGNIFICANT CHANGE UP (ref 11.5–15.5)
IANC: 7.51 K/UL — HIGH (ref 1.8–7.4)
IMM GRANULOCYTES NFR BLD AUTO: 1.1 % — HIGH (ref 0–0.9)
LYMPHOCYTES # BLD AUTO: 1.41 K/UL — SIGNIFICANT CHANGE UP (ref 1–3.3)
LYMPHOCYTES # BLD AUTO: 14 % — SIGNIFICANT CHANGE UP (ref 13–44)
MCHC RBC-ENTMCNC: 27.5 PG — SIGNIFICANT CHANGE UP (ref 27–34)
MCHC RBC-ENTMCNC: 33.3 GM/DL — SIGNIFICANT CHANGE UP (ref 32–36)
MCV RBC AUTO: 82.5 FL — SIGNIFICANT CHANGE UP (ref 80–100)
MONOCYTES # BLD AUTO: 0.8 K/UL — SIGNIFICANT CHANGE UP (ref 0–0.9)
MONOCYTES NFR BLD AUTO: 7.9 % — SIGNIFICANT CHANGE UP (ref 2–14)
NEUTROPHILS # BLD AUTO: 7.51 K/UL — HIGH (ref 1.8–7.4)
NEUTROPHILS NFR BLD AUTO: 74.6 % — SIGNIFICANT CHANGE UP (ref 43–77)
NRBC # BLD: 0 /100 WBCS — SIGNIFICANT CHANGE UP (ref 0–0)
NRBC # FLD: 0 K/UL — SIGNIFICANT CHANGE UP (ref 0–0)
PLATELET # BLD AUTO: 334 K/UL — SIGNIFICANT CHANGE UP (ref 150–400)
RBC # BLD: 4.22 M/UL — SIGNIFICANT CHANGE UP (ref 3.8–5.2)
RBC # FLD: 14.2 % — SIGNIFICANT CHANGE UP (ref 10.3–14.5)
RH IG SCN BLD-IMP: POSITIVE — SIGNIFICANT CHANGE UP
SARS-COV-2 IGG+IGM SERPL QL IA: >250 U/ML — HIGH
SARS-COV-2 IGG+IGM SERPL QL IA: POSITIVE
T PALLIDUM AB TITR SER: NEGATIVE — SIGNIFICANT CHANGE UP
WBC # BLD: 10.07 K/UL — SIGNIFICANT CHANGE UP (ref 3.8–10.5)
WBC # FLD AUTO: 10.07 K/UL — SIGNIFICANT CHANGE UP (ref 3.8–10.5)

## 2023-05-24 PROCEDURE — 59510 CESAREAN DELIVERY: CPT | Mod: U7,GC

## 2023-05-24 PROCEDURE — 59514 CESAREAN DELIVERY ONLY: CPT | Mod: AS,U8

## 2023-05-24 DEVICE — SURGICEL FIBRILLAR 1 X 2": Type: IMPLANTABLE DEVICE | Status: FUNCTIONAL

## 2023-05-24 RX ORDER — SIMETHICONE 80 MG/1
80 TABLET, CHEWABLE ORAL EVERY 4 HOURS
Refills: 0 | Status: DISCONTINUED | OUTPATIENT
Start: 2023-05-24 | End: 2023-05-27

## 2023-05-24 RX ORDER — CITRIC ACID/SODIUM CITRATE 300-500 MG
30 SOLUTION, ORAL ORAL ONCE
Refills: 0 | Status: COMPLETED | OUTPATIENT
Start: 2023-05-24 | End: 2023-05-24

## 2023-05-24 RX ORDER — DIPHENHYDRAMINE HCL 50 MG
25 CAPSULE ORAL EVERY 6 HOURS
Refills: 0 | Status: COMPLETED | OUTPATIENT
Start: 2023-05-24 | End: 2024-04-21

## 2023-05-24 RX ORDER — HEPARIN SODIUM 5000 [USP'U]/ML
5000 INJECTION INTRAVENOUS; SUBCUTANEOUS EVERY 12 HOURS
Refills: 0 | Status: DISCONTINUED | OUTPATIENT
Start: 2023-05-24 | End: 2023-05-27

## 2023-05-24 RX ORDER — OXYTOCIN 10 UNIT/ML
333.33 VIAL (ML) INJECTION
Qty: 20 | Refills: 0 | Status: DISCONTINUED | OUTPATIENT
Start: 2023-05-24 | End: 2023-05-24

## 2023-05-24 RX ORDER — NALOXONE HYDROCHLORIDE 4 MG/.1ML
0.1 SPRAY NASAL
Refills: 0 | Status: DISCONTINUED | OUTPATIENT
Start: 2023-05-24 | End: 2023-05-25

## 2023-05-24 RX ORDER — LANOLIN
1 OINTMENT (GRAM) TOPICAL EVERY 6 HOURS
Refills: 0 | Status: DISCONTINUED | OUTPATIENT
Start: 2023-05-24 | End: 2023-05-27

## 2023-05-24 RX ORDER — FAMOTIDINE 10 MG/ML
20 INJECTION INTRAVENOUS ONCE
Refills: 0 | Status: COMPLETED | OUTPATIENT
Start: 2023-05-24 | End: 2023-05-24

## 2023-05-24 RX ORDER — SODIUM CHLORIDE 9 MG/ML
1000 INJECTION, SOLUTION INTRAVENOUS ONCE
Refills: 0 | Status: DISCONTINUED | OUTPATIENT
Start: 2023-05-24 | End: 2023-05-25

## 2023-05-24 RX ORDER — DEXAMETHASONE 0.5 MG/5ML
4 ELIXIR ORAL EVERY 6 HOURS
Refills: 0 | Status: DISCONTINUED | OUTPATIENT
Start: 2023-05-24 | End: 2023-05-25

## 2023-05-24 RX ORDER — SODIUM CHLORIDE 9 MG/ML
500 INJECTION, SOLUTION INTRAVENOUS ONCE
Refills: 0 | Status: DISCONTINUED | OUTPATIENT
Start: 2023-05-24 | End: 2023-05-25

## 2023-05-24 RX ORDER — KETOROLAC TROMETHAMINE 30 MG/ML
30 SYRINGE (ML) INJECTION EVERY 6 HOURS
Refills: 0 | Status: DISCONTINUED | OUTPATIENT
Start: 2023-05-24 | End: 2023-05-25

## 2023-05-24 RX ORDER — ACETAMINOPHEN 500 MG
975 TABLET ORAL
Refills: 0 | Status: DISCONTINUED | OUTPATIENT
Start: 2023-05-24 | End: 2023-05-27

## 2023-05-24 RX ORDER — OXYCODONE HYDROCHLORIDE 5 MG/1
5 TABLET ORAL ONCE
Refills: 0 | Status: DISCONTINUED | OUTPATIENT
Start: 2023-05-24 | End: 2023-05-24

## 2023-05-24 RX ORDER — NALBUPHINE HYDROCHLORIDE 10 MG/ML
2.5 INJECTION, SOLUTION INTRAMUSCULAR; INTRAVENOUS; SUBCUTANEOUS EVERY 6 HOURS
Refills: 0 | Status: DISCONTINUED | OUTPATIENT
Start: 2023-05-24 | End: 2023-05-25

## 2023-05-24 RX ORDER — SODIUM CHLORIDE 9 MG/ML
1000 INJECTION, SOLUTION INTRAVENOUS
Refills: 0 | Status: DISCONTINUED | OUTPATIENT
Start: 2023-05-24 | End: 2023-05-24

## 2023-05-24 RX ORDER — MAGNESIUM HYDROXIDE 400 MG/1
30 TABLET, CHEWABLE ORAL
Refills: 0 | Status: DISCONTINUED | OUTPATIENT
Start: 2023-05-24 | End: 2023-05-27

## 2023-05-24 RX ORDER — ONDANSETRON 8 MG/1
4 TABLET, FILM COATED ORAL EVERY 6 HOURS
Refills: 0 | Status: DISCONTINUED | OUTPATIENT
Start: 2023-05-24 | End: 2023-05-25

## 2023-05-24 RX ORDER — ACETAMINOPHEN 500 MG
1000 TABLET ORAL ONCE
Refills: 0 | Status: COMPLETED | OUTPATIENT
Start: 2023-05-24 | End: 2023-05-24

## 2023-05-24 RX ORDER — OXYCODONE HYDROCHLORIDE 5 MG/1
5 TABLET ORAL
Refills: 0 | Status: DISCONTINUED | OUTPATIENT
Start: 2023-05-24 | End: 2023-05-27

## 2023-05-24 RX ORDER — IBUPROFEN 200 MG
600 TABLET ORAL EVERY 6 HOURS
Refills: 0 | Status: COMPLETED | OUTPATIENT
Start: 2023-05-24 | End: 2024-04-21

## 2023-05-24 RX ORDER — OXYCODONE HYDROCHLORIDE 5 MG/1
5 TABLET ORAL ONCE
Refills: 0 | Status: DISCONTINUED | OUTPATIENT
Start: 2023-05-24 | End: 2023-05-26

## 2023-05-24 RX ORDER — OXYCODONE HYDROCHLORIDE 5 MG/1
5 TABLET ORAL ONCE
Refills: 0 | Status: DISCONTINUED | OUTPATIENT
Start: 2023-05-24 | End: 2023-05-27

## 2023-05-24 RX ORDER — TETANUS TOXOID, REDUCED DIPHTHERIA TOXOID AND ACELLULAR PERTUSSIS VACCINE, ADSORBED 5; 2.5; 8; 8; 2.5 [IU]/.5ML; [IU]/.5ML; UG/.5ML; UG/.5ML; UG/.5ML
0.5 SUSPENSION INTRAMUSCULAR ONCE
Refills: 0 | Status: COMPLETED | OUTPATIENT
Start: 2023-05-24

## 2023-05-24 RX ADMIN — Medication 1000 MILLIUNIT(S)/MIN: at 11:13

## 2023-05-24 RX ADMIN — Medication 30 MILLILITER(S): at 08:14

## 2023-05-24 RX ADMIN — Medication 400 MILLIGRAM(S): at 11:00

## 2023-05-24 RX ADMIN — HEPARIN SODIUM 5000 UNIT(S): 5000 INJECTION INTRAVENOUS; SUBCUTANEOUS at 18:13

## 2023-05-24 RX ADMIN — Medication 30 MILLIGRAM(S): at 23:38

## 2023-05-24 RX ADMIN — OXYCODONE HYDROCHLORIDE 5 MILLIGRAM(S): 5 TABLET ORAL at 12:45

## 2023-05-24 RX ADMIN — Medication 975 MILLIGRAM(S): at 20:02

## 2023-05-24 RX ADMIN — Medication 1000 MILLIGRAM(S): at 11:45

## 2023-05-24 RX ADMIN — Medication 30 MILLIGRAM(S): at 18:28

## 2023-05-24 RX ADMIN — SODIUM CHLORIDE 200 MILLILITER(S): 9 INJECTION, SOLUTION INTRAVENOUS at 08:14

## 2023-05-24 RX ADMIN — Medication 30 MILLIGRAM(S): at 18:13

## 2023-05-24 RX ADMIN — OXYCODONE HYDROCHLORIDE 5 MILLIGRAM(S): 5 TABLET ORAL at 14:30

## 2023-05-24 RX ADMIN — Medication 975 MILLIGRAM(S): at 20:32

## 2023-05-24 RX ADMIN — SODIUM CHLORIDE 75 MILLILITER(S): 9 INJECTION, SOLUTION INTRAVENOUS at 11:18

## 2023-05-24 RX ADMIN — FAMOTIDINE 20 MILLIGRAM(S): 10 INJECTION INTRAVENOUS at 08:14

## 2023-05-24 NOTE — DISCHARGE NOTE OB - CARE PROVIDER_API CALL
Oscar Marx)  Obstetrics and Gynecology  [address] [facility], [address]nd [place] [address]  Phone: [phone]  Fax: [phone]  Follow Up Time:

## 2023-05-24 NOTE — OB PROVIDER H&P - NSHPPHYSICALEXAM_GEN_ALL_CORE
Physical Exam:  Vitals: ICU Vital Signs Last 24 Hrs  T(C): 36.7 (24 May 2023 06:43), Max: 36.7 (24 May 2023 06:43)  T(F): 98.06 (24 May 2023 06:43), Max: 98.06 (24 May 2023 06:43)  HR: 91 (24 May 2023 06:40) (91 - 91)  BP: 128/79 (24 May 2023 06:40) (128/79 - 128/79)  BP(mean): --  ABP: --  ABP(mean): --  RR: 16 (24 May 2023 06:43) (16 - 16)  SpO2: --      Gen: NAD, A+O x 3, resting comfortably  Resp: CTAB  Cardio: RRR  Abd: Gravid, soft, non-distended, non-tender to superficial and deep palpation in all 4 quadrants, no rebound/guarding  Ext: Warm, well perfused, 1+ nonpitting edema bilaterally    EFM: 145 bpm, moderate variability with spontaneous accelerations, no decelerations, Reactive NST   [place]: painless contractions every 5 minutes

## 2023-05-24 NOTE — DISCHARGE NOTE OB - PATIENT PORTAL LINK FT
You can access the FollowMyHealth Patient Portal offered by [facility] by registering at the following website: [email or web address]. By joining [facility]’s FollowMyHealth portal, you will also be able to view your health information using other applications (apps) compatible with our system.

## 2023-05-24 NOTE — OB PROVIDER H&P - NS_OBGYNHISTORY_OBGYN_ALL_OB_FT
OB History: : , ectopic pregnancy, abdominal salpingectomy in [place]  G2: Current pregnancy, AMA, low risk NIPs, late transfer of care at 18 weeks from [place]       - GDMA2 on metformin 1000 mg twice a day, last taken 1 week ago, patient reports fingersticks well controlled       - Admitted to antepartum for severe headaches, s/p neurology consult, MRV/MRA, diagnosed with migraines, took Fiorocet for relief    Prenatal Labs Reviewed:  T&S: O+  Rubella: Immune  Hep B: Neg  HIV: Neg  RPR: Neg  GCT: 161  GTT: 115/178/161/161  G/C: Neg  GBS: Neg      Ultrasounds:   : BPP in office 10/10, EFW 6 lb 10 oz (82%ile)    GYN Hx: Admits to history of fibroids "3 noted early in pregnancy" with history of myomectomy, Denies ovarian cysts, HSV/ STDs, abnormal pap smears

## 2023-05-24 NOTE — OB RN INTRAOPERATIVE NOTE - NSSELHIDDEN_OBGYN_ALL_OB_FT
[NS_DeliveryAttending1_OBGYN_ALL_OB_FT:[id number]],[NS_DeliveryAssist1_OBGYN_ALL_OB_FT:[id number]],[NS_DeliveryRN_OBGYN_ALL_OB_FT:[id number]]

## 2023-05-24 NOTE — OB PROVIDER DELIVERY SUMMARY - NSSELHIDDEN_OBGYN_ALL_OB_FT
[NS_DeliveryAttending1_OBGYN_ALL_OB_FT:[id number]],[NS_DeliveryAssist1_OBGYN_ALL_OB_FT:[id number]]

## 2023-05-24 NOTE — OB RN DELIVERY SUMMARY - NSSELHIDDEN_OBGYN_ALL_OB_FT
[NS_DeliveryAttending1_OBGYN_ALL_OB_FT:[id number]],[NS_DeliveryAssist1_OBGYN_ALL_OB_FT:[id number]],[NS_DeliveryRN_OBGYN_ALL_OB_FT:[id number]]

## 2023-05-24 NOTE — OB PROVIDER H&P - ASSESSMENT
38 year old  @ 37.2 weeks, admitted to L&D for scheduled primary  and bilateral salpingectomy for sterilization for history of prior abdominal myomectomy, Reactive NST, vital signs stable  - Admit to L&D  - NPO  - Fingerstick to be completed  - IV access, CBC/T&C/RPR  - Pepcid and Bicitra as per pre-op policy  - T&C 2 units PRBCs  - Anesthesia consult   - Consent to be discussed and obtained by Dr. Marx  - Plan to move to OR on time schedule  - Educated and discussed with patient the assessment and plan, pt verbalized understanding and agreement with assessment and plan, all questions answered  - Discussed with Dr. Marx

## 2023-05-24 NOTE — OB RN PATIENT PROFILE - NS_OBGYNHISTORY_OBGYN_ALL_OB_FT
OB History: : , ectopic pregnancy, abdominal salpingectomy in [place]  G2: Current pregnancy, AMA, low risk NIPs, late transfer of care at 18 weeks from [place]       - GDMA2 on metformin 1000 mg twice a day, last taken 1 week ago, patient reports fingersticks well controlled       - Admitted to antepartum for severe headaches, s/p neurology consult, MRV/MRA, diagnosed with migraines, took Fiorocet for relief    Prenatal Labs Reviewed:  T&S: O+  Rubella: Immune  Hep B: Neg  HIV: Neg  RPR: Neg  GCT: 161  GTT: 115/178/161/161  G/C: Neg  GBS: Neg      Ultrasounds:   : BPP in office 10/10, EFW 6 lb 10 oz (82%ile)    GYN Hx: Admits to history of fibroids "3 noted early in pregnancy" with history of myomectomy, Denies ovarian cysts, HSV/ STDs, abnormal pap smears

## 2023-05-24 NOTE — DISCHARGE NOTE OB - MEDICATION SUMMARY - MEDICATIONS TO STOP TAKING
I will STOP taking the medications listed below when I get home from the hospital:    aspirin 81 mg oral tablet  -- 2 tab(s) by mouth once a day    metFORMIN 1000 mg oral tablet  -- 1 tab(s) by mouth 2 times a day    ferrous sulfate 325 mg (65 mg elemental iron) oral tablet  -- 1 tab(s) by mouth 2 times a day

## 2023-05-24 NOTE — OB PROVIDER H&P - NSLOWPPHRISK_OBGYN_A_OB
[place] Pregnancy/Less than or equal to 4 previous vaginal births/No known bleeding disorder/No history of postpartum hemorrhage/No other PPH risks indicated

## 2023-05-24 NOTE — DISCHARGE NOTE OB - MATERIALS PROVIDED
Vaccinations/[facility]  Screening Program/  Immunization Record/Breastfeeding Log/Bottle Feeding Log/Breastfeeding Mother’s Support Group Information/Guide to Postpartum Care/[facility] Hearing Screen Program/Back To Sleep Handout/Shaken Baby Prevention Handout/Breastfeeding Guide and Packet

## 2023-05-24 NOTE — DISCHARGE NOTE OB - NS MD DC FALL RISK RISK
For information on Fall & Injury Prevention, visit: [email or web address] OR  [email or web address] OR  https://www.cdc.gov/steadi/patient.html

## 2023-05-24 NOTE — OB PROVIDER H&P - NSHPSOCIALHISTORY_GEN_ALL_CORE
Social: Denies cigarette/tobacco/alcohol/illicit drug use    Psych: Denies anxiety/depression/other mental health disease

## 2023-05-24 NOTE — OB PROVIDER H&P - NSICDXPASTSURGICALHX_GEN_ALL_CORE_FT
PAST SURGICAL HISTORY:  H/O myomectomy     H/O unilateral salpingectomy     S/P ectopic pregnancy

## 2023-05-24 NOTE — OB PROVIDER H&P - HISTORY OF PRESENT ILLNESS
38 year old  @ 37.2 weeks, LEONEL 2023 dated by LMP (22) consistent with 1st Trimester US, presents to L&D for scheduled primary  and bilateral salpingectomy for sterilization, secondary to history of prior abdominal myomectomy. NPO TIME 8pm last night. Patient admits to normal fetal movement. Denies vaginal bleeding, leakage of fluid, painful contractions/lower abdominal cramping, fever/chills, shortness of breath,  chest pain, increased swelling, difficulty ambulating, loss of taste/smell, nausea/vomiting/diarrhea, rash, weakness, paresthesia, change in appetite, dizziness, lightheadedness, cough, nasal congestion, runny nose    Antepartum Course:  1. GDMA2, on metformin 1000 mg twice a day  2. History of abdominal myomectomy  3. Late transfer of care at 18 weeks from [place]   4. History of abdominal salpingectomy for ectopic pregnancy

## 2023-05-24 NOTE — OB PROVIDER DELIVERY SUMMARY - NSPROVIDERDELIVERYNOTE_OBGYN_ALL_OB_FT
Viable female infant, apgar 8/8, wgt 3180 gms  delivered @ 9:44  cephalic presentation, vaccum assist x 1, clear copious fluid  loose body cord x 1  hysterotomy closed in single layer   transient uterine atony noted responded to TXA x1 and Methergine, IM x1  Fibrillar placed over hysterotomy and rectus layer,   fascia layer thickened w/ previous scarring  otherwise grossly nl uterus, tubes & ovaries      QBL: 985  UOP: 200  IVF: 1700  Dictation Number:  05-24-23 @ 11:20 Viable female infant, apgar 8/8, wgt 3180 gms  delivered @ 9:44  cephalic presentation, vaccum assist x 1, clear copious fluid  loose body cord x 1  hysterotomy closed in single layer   transient uterine atony noted responded to TXA x1 and Methergine, IM x1  Fibrillar placed over hysterotomy and rectus layer,   fascia layer thickened w/ previous scarring  otherwise grossly nl uterus, tubes & ovaries      QBL: 985  UOP: 200  IVF: 1700  Dictation Number: [id number]  23 @ 11:20

## 2023-05-24 NOTE — BRIEF OPERATIVE NOTE - VENOUS THROMBOEMBOLISM PROPHYLAXIS THERAPY
SQ Heparin
[de-identified] : Hearing Test performed to evaluate the extent of hearing loss and  to explain pt's symptoms\par today's hearing test was personally reviewed and revealed\par RT: mild to severe SNHL and LT: mild to mod-severe SNHL

## 2023-05-24 NOTE — BRIEF OPERATIVE NOTE - OPERATION/FINDINGS
Viable female infant, apgar 8/8, wgt 3180 gms  delivered @ 9:44  cephalic presentation, vaccum assist x 1, clear copious fluid  loose body cord x 1  hysterotomy closed in single layer   transient uterine atony noted responded to TXA x1 and Methergine, IM x1  Fibrillar placed over hysterotomy and rectus layer,   fascia layer thickened w/ previous scarring  otherwise grossly nl uterus, tubes & ovaries      QBL: 985  UOP: 200  IVF: 1700  Dictation Number:  05-24-23 @ 11:20 Viable female infant, apgar 8/8, wgt 3180 gms  delivered @ 9:44  cephalic presentation, vaccum assist x 1, clear copious fluid  loose body cord x 1  hysterotomy closed in single layer   transient uterine atony noted responded to TXA x1 and Methergine, IM x1  Fibrillar placed over hysterotomy and rectus layer,   fascia layer thickened w/ previous scarring  otherwise grossly nl uterus, tubes & ovaries      QBL: 985  UOP: 200  IVF: 1700  Dictation Number: [id number]  23 @ 11:20

## 2023-05-24 NOTE — OB RN DELIVERY SUMMARY - NS_RESUSCITPROC_OBGYN_ALL_OB
CPAP/Tactile Stimulation/Pulse Oximetry CPAP/Tactile Stimulation/Tracheal suctioning/Pulse Oximetry/FIO2

## 2023-05-24 NOTE — OB NEONATOLOGY/PEDIATRICIAN DELIVERY SUMMARY - NSPEDSNEONOTESA_OBGYN_ALL_OB_FT
Peds called to OR for vacuum delivery. 37.2 wk female born via primary scheduled CS for maternal history of myomectomy to a 41 y/o  mother. Pregnancy complicated by GDMA2. Maternal labs include Blood Type O+ , HIV - , RPR NR , Rubella I , Hep B - , GBS - on . ROM at time of delivery with clear fluids.  Baby emerged vigorous, crying, was w/d/s/s with APGARS of 8/8. Resuscitation included: deep suction; CPAP max settings 30%/5 from 9 MOL until transfer to NICU on same settings. Mom plans to initiate breastfeeding and formula feed, consents Hep B vaccine.     Physical Exam:  Gen: no acute distress, +grimace  HEENT:  anterior fontanel open soft and flat, nondysmorphic facies, no cleft lip/palate, ears normal set, no ear pits or tags, nares clinically patent  Resp: Mild tachypnea and retractions with CPAP in place, good air entry b/l, clear to auscultation bilaterally  Cardio: Present S1/S2, regular rate and rhythm, no murmurs  Abd: soft, non tender, non distended, umbilical cord with 3 vessels  Neuro: +palmar and plantar grasp, +suck, +usha, normal tone  Extremities: negative sweet and ortolani maneuvers, moving all extremities, no clavicular crepitus or stepoff  Skin: pink, warm  Genitals: Normal female anatomy, Jony 1, anus patent

## 2023-05-25 LAB
ANISOCYTOSIS BLD QL: SLIGHT — SIGNIFICANT CHANGE UP
BASOPHILS # BLD AUTO: 0.11 K/UL — SIGNIFICANT CHANGE UP (ref 0–0.2)
BASOPHILS NFR BLD AUTO: 0.9 % — SIGNIFICANT CHANGE UP (ref 0–2)
DACRYOCYTES BLD QL SMEAR: SLIGHT — SIGNIFICANT CHANGE UP
EOSINOPHIL # BLD AUTO: 0 K/UL — SIGNIFICANT CHANGE UP (ref 0–0.5)
EOSINOPHIL NFR BLD AUTO: 0 % — SIGNIFICANT CHANGE UP (ref 0–6)
GIANT PLATELETS BLD QL SMEAR: PRESENT — SIGNIFICANT CHANGE UP
HCT VFR BLD CALC: 28.6 % — LOW (ref 34.5–45)
HGB BLD-MCNC: 9.2 G/DL — LOW (ref 11.5–15.5)
IANC: 8.66 K/UL — HIGH (ref 1.8–7.4)
LYMPHOCYTES # BLD AUTO: 1.7 K/UL — SIGNIFICANT CHANGE UP (ref 1–3.3)
LYMPHOCYTES # BLD AUTO: 14.3 % — SIGNIFICANT CHANGE UP (ref 13–44)
MACROCYTES BLD QL: SLIGHT — SIGNIFICANT CHANGE UP
MANUAL SMEAR VERIFICATION: SIGNIFICANT CHANGE UP
MCHC RBC-ENTMCNC: 26.8 PG — LOW (ref 27–34)
MCHC RBC-ENTMCNC: 32.2 GM/DL — SIGNIFICANT CHANGE UP (ref 32–36)
MCV RBC AUTO: 83.4 FL — SIGNIFICANT CHANGE UP (ref 80–100)
MONOCYTES # BLD AUTO: 0.42 K/UL — SIGNIFICANT CHANGE UP (ref 0–0.9)
MONOCYTES NFR BLD AUTO: 3.5 % — SIGNIFICANT CHANGE UP (ref 2–14)
NEUTROPHILS # BLD AUTO: 9.48 K/UL — HIGH (ref 1.8–7.4)
NEUTROPHILS NFR BLD AUTO: 77.7 % — HIGH (ref 43–77)
NEUTS BAND # BLD: 1.8 % — SIGNIFICANT CHANGE UP (ref 0–6)
PLAT MORPH BLD: ABNORMAL
PLATELET # BLD AUTO: 250 K/UL — SIGNIFICANT CHANGE UP (ref 150–400)
PLATELET COUNT - ESTIMATE: NORMAL — SIGNIFICANT CHANGE UP
RBC # BLD: 3.43 M/UL — LOW (ref 3.8–5.2)
RBC # FLD: 14 % — SIGNIFICANT CHANGE UP (ref 10.3–14.5)
RBC BLD AUTO: NORMAL — SIGNIFICANT CHANGE UP
SMUDGE CELLS # BLD: PRESENT — SIGNIFICANT CHANGE UP
VARIANT LYMPHS # BLD: 1.8 % — SIGNIFICANT CHANGE UP (ref 0–6)
WBC # BLD: 11.92 K/UL — HIGH (ref 3.8–10.5)
WBC # FLD AUTO: 11.92 K/UL — HIGH (ref 3.8–10.5)

## 2023-05-25 RX ORDER — SENNA PLUS 8.6 MG/1
1 TABLET ORAL
Refills: 0 | Status: DISCONTINUED | OUTPATIENT
Start: 2023-05-25 | End: 2023-05-27

## 2023-05-25 RX ORDER — IBUPROFEN 200 MG
600 TABLET ORAL EVERY 6 HOURS
Refills: 0 | Status: DISCONTINUED | OUTPATIENT
Start: 2023-05-25 | End: 2023-05-27

## 2023-05-25 RX ORDER — ASCORBIC ACID 60 MG
500 TABLET,CHEWABLE ORAL DAILY
Refills: 0 | Status: DISCONTINUED | OUTPATIENT
Start: 2023-05-25 | End: 2023-05-27

## 2023-05-25 RX ORDER — DIPHENHYDRAMINE HCL 50 MG
25 CAPSULE ORAL EVERY 6 HOURS
Refills: 0 | Status: DISCONTINUED | OUTPATIENT
Start: 2023-05-25 | End: 2023-05-27

## 2023-05-25 RX ORDER — FERROUS SULFATE 325(65) MG
325 TABLET ORAL EVERY 8 HOURS
Refills: 0 | Status: DISCONTINUED | OUTPATIENT
Start: 2023-05-25 | End: 2023-05-27

## 2023-05-25 RX ADMIN — Medication 25 MILLIGRAM(S): at 03:26

## 2023-05-25 RX ADMIN — Medication 975 MILLIGRAM(S): at 17:50

## 2023-05-25 RX ADMIN — Medication 975 MILLIGRAM(S): at 03:50

## 2023-05-25 RX ADMIN — Medication 975 MILLIGRAM(S): at 10:08

## 2023-05-25 RX ADMIN — Medication 975 MILLIGRAM(S): at 23:10

## 2023-05-25 RX ADMIN — Medication 600 MILLIGRAM(S): at 21:07

## 2023-05-25 RX ADMIN — Medication 30 MILLIGRAM(S): at 05:39

## 2023-05-25 RX ADMIN — HEPARIN SODIUM 5000 UNIT(S): 5000 INJECTION INTRAVENOUS; SUBCUTANEOUS at 05:39

## 2023-05-25 RX ADMIN — HEPARIN SODIUM 5000 UNIT(S): 5000 INJECTION INTRAVENOUS; SUBCUTANEOUS at 17:51

## 2023-05-25 RX ADMIN — Medication 1 TABLET(S): at 15:29

## 2023-05-25 RX ADMIN — Medication 30 MILLIGRAM(S): at 00:30

## 2023-05-25 RX ADMIN — SENNA PLUS 1 TABLET(S): 8.6 TABLET ORAL at 17:51

## 2023-05-25 RX ADMIN — Medication 325 MILLIGRAM(S): at 21:08

## 2023-05-25 RX ADMIN — Medication 500 MILLIGRAM(S): at 15:29

## 2023-05-25 RX ADMIN — Medication 975 MILLIGRAM(S): at 18:20

## 2023-05-25 RX ADMIN — Medication 975 MILLIGRAM(S): at 09:38

## 2023-05-25 RX ADMIN — Medication 30 MILLIGRAM(S): at 06:21

## 2023-05-25 RX ADMIN — Medication 975 MILLIGRAM(S): at 03:14

## 2023-05-25 RX ADMIN — Medication 325 MILLIGRAM(S): at 17:50

## 2023-05-25 RX ADMIN — Medication 975 MILLIGRAM(S): at 23:40

## 2023-05-25 RX ADMIN — MAGNESIUM HYDROXIDE 30 MILLILITER(S): 400 TABLET, CHEWABLE ORAL at 23:09

## 2023-05-25 RX ADMIN — Medication 600 MILLIGRAM(S): at 21:35

## 2023-05-25 RX ADMIN — SIMETHICONE 80 MILLIGRAM(S): 80 TABLET, CHEWABLE ORAL at 23:09

## 2023-05-26 RX ORDER — TETANUS TOXOID, REDUCED DIPHTHERIA TOXOID AND ACELLULAR PERTUSSIS VACCINE, ADSORBED 5; 2.5; 8; 8; 2.5 [IU]/.5ML; [IU]/.5ML; UG/.5ML; UG/.5ML; UG/.5ML
0.5 SUSPENSION INTRAMUSCULAR ONCE
Refills: 0 | Status: COMPLETED | OUTPATIENT
Start: 2023-05-26 | End: 2023-05-26

## 2023-05-26 RX ADMIN — Medication 325 MILLIGRAM(S): at 15:45

## 2023-05-26 RX ADMIN — Medication 975 MILLIGRAM(S): at 21:27

## 2023-05-26 RX ADMIN — HEPARIN SODIUM 5000 UNIT(S): 5000 INJECTION INTRAVENOUS; SUBCUTANEOUS at 05:02

## 2023-05-26 RX ADMIN — Medication 975 MILLIGRAM(S): at 08:34

## 2023-05-26 RX ADMIN — Medication 500 MILLIGRAM(S): at 15:44

## 2023-05-26 RX ADMIN — Medication 600 MILLIGRAM(S): at 18:31

## 2023-05-26 RX ADMIN — MAGNESIUM HYDROXIDE 30 MILLILITER(S): 400 TABLET, CHEWABLE ORAL at 18:35

## 2023-05-26 RX ADMIN — SIMETHICONE 80 MILLIGRAM(S): 80 TABLET, CHEWABLE ORAL at 06:24

## 2023-05-26 RX ADMIN — HEPARIN SODIUM 5000 UNIT(S): 5000 INJECTION INTRAVENOUS; SUBCUTANEOUS at 18:32

## 2023-05-26 RX ADMIN — Medication 975 MILLIGRAM(S): at 22:08

## 2023-05-26 RX ADMIN — Medication 600 MILLIGRAM(S): at 19:00

## 2023-05-26 RX ADMIN — OXYCODONE HYDROCHLORIDE 5 MILLIGRAM(S): 5 TABLET ORAL at 15:48

## 2023-05-26 RX ADMIN — Medication 600 MILLIGRAM(S): at 12:35

## 2023-05-26 RX ADMIN — Medication 325 MILLIGRAM(S): at 21:32

## 2023-05-26 RX ADMIN — Medication 975 MILLIGRAM(S): at 09:10

## 2023-05-26 RX ADMIN — Medication 975 MILLIGRAM(S): at 15:44

## 2023-05-26 RX ADMIN — Medication 975 MILLIGRAM(S): at 16:30

## 2023-05-26 RX ADMIN — Medication 600 MILLIGRAM(S): at 05:30

## 2023-05-26 RX ADMIN — Medication 1 TABLET(S): at 12:35

## 2023-05-26 RX ADMIN — SENNA PLUS 1 TABLET(S): 8.6 TABLET ORAL at 05:02

## 2023-05-26 RX ADMIN — Medication 600 MILLIGRAM(S): at 13:08

## 2023-05-26 RX ADMIN — OXYCODONE HYDROCHLORIDE 5 MILLIGRAM(S): 5 TABLET ORAL at 16:30

## 2023-05-26 RX ADMIN — TETANUS TOXOID, REDUCED DIPHTHERIA TOXOID AND ACELLULAR PERTUSSIS VACCINE, ADSORBED 0.5 MILLILITER(S): 5; 2.5; 8; 8; 2.5 SUSPENSION INTRAMUSCULAR at 05:31

## 2023-05-26 RX ADMIN — Medication 325 MILLIGRAM(S): at 05:06

## 2023-05-26 RX ADMIN — SENNA PLUS 1 TABLET(S): 8.6 TABLET ORAL at 18:31

## 2023-05-26 RX ADMIN — Medication 600 MILLIGRAM(S): at 05:02

## 2023-05-26 NOTE — PROGRESS NOTE ADULT - ASSESSMENT
A/P: 37yo POD#1 s/p scheduled pLTCS 2/2 prior myomectomy.  Patient is stable and doing well post-operatively.    - Continue regular diet  - Increase ambulation  - Continue motrin, tylenol, oxycodone PRN for pain control.    - AM CBC with appropriate H/H drop    Mandy Treadwell, PGY1
POST OP DAY  1  s/p   SECTION    SUBJECTIVE:  Appears comfortable. No complaints.     PAIN SCALE SCORE: [x] Refer to charted pain scores  THERAPY:       [ x ] Spinal morphine        [    ] Epidural morphine        [    ] IV PCA Hydromorphone 1 mg/ml    OBJECTIVE:    - Sedation Score:   [ x ] Alert   [  ] Drowsy    [  ] Arousable	  [  ] Asleep	[  ] Unresponsive  - Side Effects:	[ x ] None    [  ] Nausea      [  ] Pruritus     [  ] Weakness   [  ] Numbness      ASSESSMENT:  Ambulating, urinating, no neadache.  No complications.    PLAN:   [   ] Discontinue    [  ] Continue    [ x ] Change to prn Analgesics as per primary service.    DOCUMENTATION & VERIFICATION OF CURRENT MEDS [ x ] Done    
Attending Addendum:  Pt seen and examined.  POD#2 s/p PCS for history of myomectomy.  Pt doing well w/o complaints.  Ambulating, voiding, passing flatus and tolerating PO.  Plan for POD#3 or 4.    LORETTA Clifton MD

## 2023-05-27 VITALS
HEART RATE: 89 BPM | OXYGEN SATURATION: 100 % | RESPIRATION RATE: 18 BRPM | SYSTOLIC BLOOD PRESSURE: 123 MMHG | DIASTOLIC BLOOD PRESSURE: 80 MMHG | TEMPERATURE: 98 F

## 2023-05-27 PROCEDURE — 59514 CESAREAN DELIVERY ONLY: CPT | Mod: AS

## 2023-05-27 RX ORDER — METFORMIN HYDROCHLORIDE 850 MG/1
1 TABLET ORAL
Refills: 0 | DISCHARGE

## 2023-05-27 RX ORDER — IBUPROFEN 200 MG
1 TABLET ORAL
Qty: 0 | Refills: 0 | DISCHARGE
Start: 2023-05-27

## 2023-05-27 RX ORDER — ASPIRIN/CALCIUM CARB/MAGNESIUM 324 MG
2 TABLET ORAL
Refills: 0 | DISCHARGE

## 2023-05-27 RX ORDER — DOCUSATE SODIUM 100 MG
1 CAPSULE ORAL
Refills: 0 | DISCHARGE

## 2023-05-27 RX ORDER — FERROUS SULFATE 325(65) MG
1 TABLET ORAL
Refills: 0 | DISCHARGE

## 2023-05-27 RX ORDER — OXYCODONE HYDROCHLORIDE 5 MG/1
5 TABLET ORAL ONCE
Refills: 0 | Status: DISCONTINUED | OUTPATIENT
Start: 2023-05-27 | End: 2023-05-27

## 2023-05-27 RX ADMIN — Medication 600 MILLIGRAM(S): at 11:40

## 2023-05-27 RX ADMIN — OXYCODONE HYDROCHLORIDE 5 MILLIGRAM(S): 5 TABLET ORAL at 01:50

## 2023-05-27 RX ADMIN — Medication 325 MILLIGRAM(S): at 06:27

## 2023-05-27 RX ADMIN — OXYCODONE HYDROCHLORIDE 5 MILLIGRAM(S): 5 TABLET ORAL at 01:21

## 2023-05-27 RX ADMIN — SENNA PLUS 1 TABLET(S): 8.6 TABLET ORAL at 06:27

## 2023-05-27 RX ADMIN — HEPARIN SODIUM 5000 UNIT(S): 5000 INJECTION INTRAVENOUS; SUBCUTANEOUS at 06:27

## 2023-05-27 RX ADMIN — Medication 600 MILLIGRAM(S): at 12:15

## 2023-05-27 RX ADMIN — Medication 600 MILLIGRAM(S): at 01:13

## 2023-05-27 RX ADMIN — Medication 975 MILLIGRAM(S): at 09:15

## 2023-05-27 RX ADMIN — Medication 600 MILLIGRAM(S): at 01:50

## 2023-05-27 RX ADMIN — Medication 600 MILLIGRAM(S): at 06:26

## 2023-05-27 RX ADMIN — Medication 975 MILLIGRAM(S): at 08:36

## 2023-05-27 NOTE — PROGRESS NOTE ADULT - SUBJECTIVE AND OBJECTIVE BOX
Postpartum Note,  Section  Post op Day 3    Subjective:  The patient feels well.  She is ambulating.   She is tolerating regular diet.  She denies nausea and vomiting.  She is voiding.  Her pain is controlled.  She reports normal postpartum bleeding.    Physical exam:    Vital Signs Last 24 Hrs  T(C): 36.8 (27 May 2023 05:59), Max: 36.8 (26 May 2023 14:27)  T(F): 98.2 (27 May 2023 05:59), Max: 98.3 (26 May 2023 14:27)  HR: 87 (27 May 2023 05:59) (82 - 98)  BP: 117/62 (27 May 2023 05:59) (113/64 - 125/70)  BP(mean): --  RR: 18 (27 May 2023 05:59) (18 - 18)  SpO2: 100% (27 May 2023 05:59) (100% - 100%)    Parameters below as of 27 May 2023 02:06  Patient On (Oxygen Delivery Method): room air        Gen: NAD    Abdomen: Soft, nontender, no distension , firm uterine fundus at umbilicus.  Incision: Clean, dry, and intact  Pelvic: Normal lochia noted  Ext: No calf tenderness    LABS:      Rubella status:     Allergies    No Known Allergies    Intolerances      MEDICATIONS  (STANDING):  acetaminophen     Tablet .. 975 milliGRAM(s) Oral <User Schedule>  ascorbic acid 500 milliGRAM(s) Oral daily  ferrous    sulfate 325 milliGRAM(s) Oral every 8 hours  heparin   Injectable 5000 Unit(s) SubCutaneous every 12 hours  ibuprofen  Tablet. 600 milliGRAM(s) Oral every 6 hours  prenatal multivitamin 1 Tablet(s) Oral daily  senna 1 Tablet(s) Oral two times a day    MEDICATIONS  (PRN):  diphenhydrAMINE 25 milliGRAM(s) Oral every 6 hours PRN Pruritus  lanolin Ointment 1 Application(s) Topical every 6 hours PRN Sore Nipples  magnesium hydroxide Suspension 30 milliLiter(s) Oral two times a day PRN Constipation  oxyCODONE    IR 5 milliGRAM(s) Oral every 3 hours PRN Moderate to Severe Pain (4-10)  oxyCODONE    IR 5 milliGRAM(s) Oral once PRN Moderate to Severe Pain (4-10)  simethicone 80 milliGRAM(s) Chew every 4 hours PRN Gas        Assessment and Plan  POD # 3 s/p  section  Doing well.  Encourage ambulation.  DC home in am  FU 1 weeks  Instruction given  Questions answered    
OB Progress Note:  Delivery, POD#1    S: Patient seen at bedside. Pain well controlled, tolerating regular diet, passing flatus, ambulating without difficulty, voiding spontaneously. Denies heavy vaginal bleeding, N/V, CP/SOB/lightheadedness/dizziness, headache, visual disturbances, epigastric pain.     O:   Vital Signs Last 24 Hrs  T(C): 36.7 (25 May 2023 06:00), Max: 37.2 (24 May 2023 10:41)  T(F): 98 (25 May 2023 06:00), Max: 99 (24 May 2023 10:41)  HR: 73 (25 May 2023 06:00) (65 - 91)  BP: 114/68 (25 May 2023 06:00) (108/63 - 136/68)  BP(mean): 81 (24 May 2023 14:30) (77 - 86)  RR: 18 (25 May 2023 06:00) (12 - 23)  SpO2: 100% (25 May 2023 06:00) (96% - 100%)    Parameters below as of 24 May 2023 22:30  Patient On (Oxygen Delivery Method): room air        Labs:  Blood type: O Positive  Rubella IgG: RPR: Negative                          9.2<L>   11.92<H> >-----------< 250    (  @ 06:34 )             28.6<L>                        11.6   10.07 >-----------< 334    (  @ 06:35 )             34.8        PE:  General: NAD  Abdomen: Mildly distended, appropriately tender, Fundus firm, incision c/d/i.  VE: No heavy vaginal bleeding  Extremities: No erythema, no pitting edema
S: 39yo POD#2 s/p pLTCS for history of myomectomy.  Pain is well controlled. She is tolerating a regular diet and passing flatus. She is voiding spontaneously, and ambulating without difficulty. Denies CP/SOB. Denies lightheadedness/dizziness. Denies N/V.    O:  Vitals:  Vital Signs Last 24 Hrs  T(C): 36.8 (26 May 2023 06:00), Max: 37.1 (25 May 2023 22:01)  T(F): 98.2 (26 May 2023 06:00), Max: 98.8 (25 May 2023 22:01)  HR: 60 (26 May 2023 06:00) (60 - 92)  BP: 117/64 (26 May 2023 06:00) (105/61 - 117/64)  BP(mean): --  RR: 16 (26 May 2023 06:00) (16 - 18)  SpO2: 100% (26 May 2023 06:00) (100% - 100%)    Parameters below as of 25 May 2023 22:01  Patient On (Oxygen Delivery Method): room air        MEDICATIONS  (STANDING):  acetaminophen     Tablet .. 975 milliGRAM(s) Oral <User Schedule>  ascorbic acid 500 milliGRAM(s) Oral daily  ferrous    sulfate 325 milliGRAM(s) Oral every 8 hours  heparin   Injectable 5000 Unit(s) SubCutaneous every 12 hours  ibuprofen  Tablet. 600 milliGRAM(s) Oral every 6 hours  prenatal multivitamin 1 Tablet(s) Oral daily  senna 1 Tablet(s) Oral two times a day      MEDICATIONS  (PRN):  diphenhydrAMINE 25 milliGRAM(s) Oral every 6 hours PRN Pruritus  lanolin Ointment 1 Application(s) Topical every 6 hours PRN Sore Nipples  magnesium hydroxide Suspension 30 milliLiter(s) Oral two times a day PRN Constipation  oxyCODONE    IR 5 milliGRAM(s) Oral once PRN Moderate to Severe Pain (4-10)  oxyCODONE    IR 5 milliGRAM(s) Oral every 3 hours PRN Moderate to Severe Pain (4-10)  oxyCODONE    IR 5 milliGRAM(s) Oral once PRN Moderate to Severe Pain (4-10)  simethicone 80 milliGRAM(s) Chew every 4 hours PRN Gas      Labs:  Blood type: O Positive  Rubella IgG: RPR: Negative                          9.2<L>   11.92<H> >-----------< 250    ( 05-25 @ 06:34 )             28.6<L>                        11.6   10.07 >-----------< 334    ( 05-24 @ 06:35 )             34.8    PE:  General: NAD  Abdomen: Soft, appropriately tender, incision c/d/i with steristrips  Lochia: WNL  Extremities: No calf tenderness    A/P: 39yo POD#2 s/p pLTCS for history of myomectomy.  Patient is stable and doing well post-operatively.    - Continue regular diet.  - Encouraged use of abdominal binder.  - Increase ambulation.  - Continue motrin, tylenol, oxycodone PRN for pain control.    - Discharge planning    Lacy GAITAN

## 2023-06-01 PROBLEM — O09.529 SUPERVISION OF ELDERLY MULTIGRAVIDA, UNSPECIFIED TRIMESTER: Chronic | Status: ACTIVE | Noted: 2023-05-16

## 2023-06-01 PROBLEM — O34.29 MATERNAL CARE DUE TO UTERINE SCAR FROM OTHER PREVIOUS SURGERY: Chronic | Status: ACTIVE | Noted: 2023-05-16

## 2023-06-01 PROBLEM — O00.90 UNSPECIFIED ECTOPIC PREGNANCY WITHOUT INTRAUTERINE PREGNANCY: Chronic | Status: ACTIVE | Noted: 2023-05-16

## 2023-06-01 PROBLEM — O24.419 GESTATIONAL DIABETES MELLITUS IN PREGNANCY, UNSPECIFIED CONTROL: Chronic | Status: ACTIVE | Noted: 2023-05-16

## 2023-06-01 PROBLEM — D57.3 SICKLE-CELL TRAIT: Chronic | Status: ACTIVE | Noted: 2023-05-16

## 2023-06-09 ENCOUNTER — APPOINTMENT (OUTPATIENT)
Dept: OBGYN | Facility: CLINIC | Age: 38
End: 2023-06-09
Payer: COMMERCIAL

## 2023-06-09 VITALS — SYSTOLIC BLOOD PRESSURE: 152 MMHG | DIASTOLIC BLOOD PRESSURE: 90 MMHG

## 2023-06-09 VITALS
BODY MASS INDEX: 36.94 KG/M2 | SYSTOLIC BLOOD PRESSURE: 164 MMHG | HEIGHT: 70 IN | DIASTOLIC BLOOD PRESSURE: 90 MMHG | WEIGHT: 258 LBS

## 2023-06-09 DIAGNOSIS — D25.9 MATERNAL CARE FOR BENIGN TUMOR OF CORPUS UTERI, UNSPECIFIED TRIMESTER: ICD-10-CM

## 2023-06-09 DIAGNOSIS — O34.10 MATERNAL CARE FOR BENIGN TUMOR OF CORPUS UTERI, UNSPECIFIED TRIMESTER: ICD-10-CM

## 2023-06-09 DIAGNOSIS — Z28.39 SUPERVISION OF OTHER HIGH RISK PREGNANCIES, UNSPECIFIED TRIMESTER: ICD-10-CM

## 2023-06-09 DIAGNOSIS — Z3A.32 32 WEEKS GESTATION OF PREGNANCY: ICD-10-CM

## 2023-06-09 DIAGNOSIS — O99.810 ABNORMAL GLUCOSE COMPLICATING PREGNANCY: ICD-10-CM

## 2023-06-09 DIAGNOSIS — Z3A.18 18 WEEKS GESTATION OF PREGNANCY: ICD-10-CM

## 2023-06-09 DIAGNOSIS — Z3A.28 28 WEEKS GESTATION OF PREGNANCY: ICD-10-CM

## 2023-06-09 DIAGNOSIS — O09.529 SUPERVISION OF ELDERLY MULTIGRAVIDA, UNSPECIFIED TRIMESTER: ICD-10-CM

## 2023-06-09 DIAGNOSIS — O09.899 SUPERVISION OF OTHER HIGH RISK PREGNANCIES, UNSPECIFIED TRIMESTER: ICD-10-CM

## 2023-06-09 DIAGNOSIS — Z3A.24 24 WEEKS GESTATION OF PREGNANCY: ICD-10-CM

## 2023-06-09 DIAGNOSIS — Z3A.34 34 WEEKS GESTATION OF PREGNANCY: ICD-10-CM

## 2023-06-09 DIAGNOSIS — O99.019 ANEMIA COMPLICATING PREGNANCY, UNSPECIFIED TRIMESTER: ICD-10-CM

## 2023-06-09 DIAGNOSIS — Z3A.35 35 WEEKS GESTATION OF PREGNANCY: ICD-10-CM

## 2023-06-09 DIAGNOSIS — Z3A.36 36 WEEKS GESTATION OF PREGNANCY: ICD-10-CM

## 2023-06-09 DIAGNOSIS — R73.09 OTHER ABNORMAL GLUCOSE: ICD-10-CM

## 2023-06-09 DIAGNOSIS — Z3A.30 30 WEEKS GESTATION OF PREGNANCY: ICD-10-CM

## 2023-06-09 DIAGNOSIS — R03.0 ELEVATED BLOOD-PRESSURE READING, W/OUT DIAGNOSIS OF HYPERTENSION: ICD-10-CM

## 2023-06-09 PROCEDURE — 0503F POSTPARTUM CARE VISIT: CPT

## 2023-06-09 RX ORDER — BLOOD SUGAR DIAGNOSTIC
STRIP MISCELLANEOUS 4 TIMES DAILY
Qty: 1 | Refills: 5 | Status: DISCONTINUED | COMMUNITY
Start: 2023-03-29 | End: 2023-06-09

## 2023-06-09 RX ORDER — CHLORHEXIDINE GLUCONATE 4 %
325 (65 FE) LIQUID (ML) TOPICAL TWICE DAILY
Qty: 60 | Refills: 10 | Status: DISCONTINUED | COMMUNITY
Start: 2023-03-27 | End: 2023-06-09

## 2023-06-09 RX ORDER — ALCOHOL ANTISEPTIC PADS
PADS, MEDICATED (EA) TOPICAL
Qty: 1 | Refills: 5 | Status: DISCONTINUED | COMMUNITY
Start: 2023-03-29 | End: 2023-06-09

## 2023-06-09 RX ORDER — ASPIRIN ENTERIC COATED TABLETS 81 MG 81 MG/1
81 TABLET, DELAYED RELEASE ORAL DAILY
Qty: 60 | Refills: 11 | Status: DISCONTINUED | COMMUNITY
Start: 2023-01-18 | End: 2023-06-09

## 2023-06-09 RX ORDER — NIFEDIPINE 30 MG/1
30 TABLET, EXTENDED RELEASE ORAL DAILY
Qty: 30 | Refills: 0 | Status: ACTIVE | COMMUNITY
Start: 2023-06-09 | End: 1900-01-01

## 2023-06-09 RX ORDER — PANTOPRAZOLE 40 MG/1
40 TABLET, DELAYED RELEASE ORAL DAILY
Qty: 30 | Refills: 2 | Status: DISCONTINUED | COMMUNITY
Start: 2023-04-21 | End: 2023-06-09

## 2023-06-09 RX ORDER — BLOOD-GLUCOSE METER
W/DEVICE KIT MISCELLANEOUS
Qty: 1 | Refills: 0 | Status: DISCONTINUED | COMMUNITY
Start: 2023-03-29 | End: 2023-06-09

## 2023-06-09 RX ORDER — METFORMIN HYDROCHLORIDE 500 MG/1
500 TABLET, COATED ORAL TWICE DAILY
Qty: 120 | Refills: 1 | Status: DISCONTINUED | COMMUNITY
Start: 2023-04-09 | End: 2023-06-09

## 2023-06-09 RX ORDER — DOCUSATE SODIUM 100 MG/1
100 CAPSULE, LIQUID FILLED ORAL TWICE DAILY
Qty: 1 | Refills: 5 | Status: DISCONTINUED | COMMUNITY
Start: 2023-03-27 | End: 2023-06-09

## 2023-06-09 NOTE — HISTORY OF PRESENT ILLNESS
[Postpartum Follow Up] : postpartum follow up [Complications:___] : no complications [Delivery Date: ___] : on [unfilled] [Primary C/S] : delivered by  section [Female] : Delivery History: baby girl [Wt. ___] : weighing [unfilled] [Breastfeeding] : currently nursing [S/Sx PP Depression] : no signs/symptoms of postpartum depression [Clean/Dry/Intact] : clean, dry and intact [Erythema] : not erythematous [Back to Normal] : is back to normal in size [Mild] : mild vaginal bleeding [Normal] : the vagina was normal [Cervix Sample Taken] : cervical sample not taken for a Pap smear [Not Done] : Examination of breasts not done [Doing Well] : is doing well [No Sign of Infection] : is showing no signs of infection [Excellent Pain Control] : has excellent pain control [None] : None [de-identified] : 37 weeks [de-identified] : Denies headache ruq pain, vision changes n/v [de-identified] : /84 [de-identified] : B

## 2023-06-09 NOTE — ATTENDING NOTE
[FreeTextEntry4] : Discussed elevated bp and need to call.  Start procardia and monitor bp at home.  Call bp >140/90 return to hospital 160/110 or headaches etc.

## 2023-07-07 ENCOUNTER — APPOINTMENT (OUTPATIENT)
Dept: OBGYN | Facility: CLINIC | Age: 38
End: 2023-07-07
Payer: COMMERCIAL

## 2023-07-07 VITALS
HEIGHT: 70 IN | DIASTOLIC BLOOD PRESSURE: 82 MMHG | SYSTOLIC BLOOD PRESSURE: 138 MMHG | WEIGHT: 139 LBS | BODY MASS INDEX: 19.9 KG/M2

## 2023-07-07 PROCEDURE — 0503F POSTPARTUM CARE VISIT: CPT

## 2023-07-07 NOTE — HISTORY OF PRESENT ILLNESS
[Postpartum Follow Up] : postpartum follow up [Delivery Date: ___] : on [unfilled] [Primary C/S] : delivered by  section [Female] : Delivery History: baby girl [Wt. ___] : weighing [unfilled] [Healed] : healed [Back to Normal] : is back to normal in size [Normal] : the vagina was normal [Not Done] : Examination of breasts not done [Doing Well] : is doing well [No Sign of Infection] : is showing no signs of infection [Excellent Pain Control] : has excellent pain control [None] : None [Complications:___] : no complications [Breastfeeding] : not currently nursing [S/Sx PP Depression] : no signs/symptoms of postpartum depression [Erythema] : not erythematous [Cervix Sample Taken] : cervical sample not taken for a Pap smear [de-identified] : RTO in 2 months for annual

## 2023-09-06 NOTE — OB PROVIDER TRIAGE NOTE - ABORTIONS, OB PROFILE
[FreeTextEntry1] : Impression:   Treatment:  I spoke with wound care, Ingrid Bates, about the culture result and is in agreement to start Cipro even though it is still preliminary. I did start Cipro 500mg BID. I put a Silvadene gauze dressing on. She could elevate and rest. Use gauze dressings with Silvadene for now and she could follow-up with wound care next week because this is a sensitive lesion she is asking for Percocet because Tylenol does not help. I did write her a prescription for the week. 1

## 2023-09-25 NOTE — OB PST NOTE - NSANTHOBSERVEDRD_ENT_A_CORE

## 2023-12-27 NOTE — OB RN PATIENT PROFILE - NS_NPO_OBGYN_ALL_OB_DT
Karmen Peñaloza needs to be seen for an appointment before further refills are given.  
23-May-2023 22:00

## 2025-03-10 NOTE — OB PST NOTE - [facility]
OUTPATIENT PROGRESS NOTE        HISTORY      The patient is a 63-year-old woman here for a Welcome to Medicare visit and chronic disease follow-up.    She reports no new neurological symptoms and has an upcoming appointment with neurology on Friday.  She has completed plavix and back to just taking aspirin 81mg daily. She just completed her event monitor and has not received her results yet. She was informed that her cholesterol level should be maintained below 70, and she has been advised to discontinue Lipitor if her levels remain within this range.  She has been taking atorvastatin 40mg daily expressed a desire to switch from an 80 mg to a 40 mg dosage of Lipitor, as she finds it inconvenient to halve the pills. She has been compliant with her medication regimen, even though she was previously advised by a neurologist that her cholesterol levels were sufficiently low to discontinue Lipitor.    She continues to experience significant pain, particularly in her right  knee, which remains sore despite increased use. She has been engaging in daily walks, which she finds beneficial. She has noticed a bump along her scar when lying flat, which she had not observed previously. This bump becomes more pronounced when she experiences stiffness. She is unable to feel the bump at present.    She is currently enrolled in a weight management program and is scheduled for a follow-up appointment in April. She is on topiramate 50 mg daily and has discontinued Saxenda injections.    She underwent knee surgery on 12/17/2024 and fractured her right wrist on 12/20/2024, which was subsequently treated in February 2025. She has been using a brace for her wrist. She has resumed driving and is due for a mammogram. She reports normal urination, although she occasionally experiences hourly urination at night.    SOCIAL HISTORY  She is retiring from the post office.    MEDICATIONS  Current: topiramate, atorvastatin  Discontinued:  Saxenda        MEDICATIONS  Medications were reviewed and updated today  Outpatient Medications Marked as Taking for the 3/10/25 encounter (Office Visit) with Kristine Green MD   Medication Sig Dispense Refill    topiramate (TOPAMAX) 25 MG tablet Take 50 mg by mouth daily.      HYDROcodone-acetaminophen (NORCO) 5-325 MG per tablet Take 1-2 tablets by mouth every 6 hours as needed for Pain. 120 tablet 0    amitriptyline (ELAVIL) 25 MG tablet Take 3 tablets by mouth at bedtime. 90 tablet 5    gabapentin (NEURONTIN) 600 MG tablet Take 1 tablet by mouth 4 times daily. 120 tablet 5    aspirin (ECOTRIN) 81 MG EC tablet Take 1 tablet by mouth daily. 30 tablet 0    Daily Multiple Vitamins TABS Take 1 tablet by mouth daily.         ALLERGIES  Allergies as of 03/10/2025 - Reviewed 03/10/2025   Allergen Reaction Noted    Bactrim NAUSEA     Feldene [piroxicam] NAUSEA     Meridia NAUSEA     Sibutramine GI UPSET 11/13/2023    Dicloxacillin GI UPSET 12/23/2013       HISTORIES  Past Medical History:   Diagnosis Date    Aortic regurgitation     SBE Prophylaxis    Arthritis     Chronic arm pain     Degenerative arthritis of lumbar spine     Kidney stone 12/01/1983    PONV (postoperative nausea and vomiting)     Stroke  (CMD) 01/06/2025     Past Surgical History:   Procedure Laterality Date    Abdomen surgery  1983    gastric bypass    Arthroplasty Left 03/11/2021    Carpal tunnel release  02/16/2012    right    Colonoscopy  12/05/2013    repeat 12/2023    De quervain's release      right    Elbow surgery      2 on R - one on left    Foot fasciotomy  1/23/13, 1/15/14    Fracture surgery      Fusion foot bone,midtarsal,1 jt Right 03/09/2020    Dr. Freire    Hysterectomy      Tonsillectomy and adenoidectomy      Total abdom hysterectomy  12/19/2012    Total ankle replacement Right 03/09/2020    Dr. Freire    Total knee arthroplasty Right 12/17/2024     Social History     Tobacco Use    Smoking status: Never    Smokeless tobacco: Never    Vaping Use    Vaping status: never used   Substance Use Topics    Alcohol use: No     Alcohol/week: 0.0 standard drinks of alcohol    Drug use: No     Family History   Adopted: Yes       REVIEW OF SYSTEMS  General:  No fatigue.  No fever.  No chills.  No recent weight changes.  Eyes:  No drainage.  No pain.  No recent changes in vision.  ENT:  No runny nose.  No sneezing.  No nasal drainage.  Cardiac:  No chest pain.  No palpitation.  No syncope or near syncope.  No orthopnea or PND.  Respiratory:  No cough.  No wheezing.  No hemoptysis.  No shortness of breath.  GI:  No nausea.  No vomiting.  No abdominal pain.  No diarrhea.  No rectal bleeding.  No dysphagia.  :  No hematuria.  No flank pain.  No dysuria.  No irritative urinary symptoms.  Normal stream.  Endocrine:  No weight change.  No dry skin.  No palpitation.  No hair loss.  No constipation.  Skin:  No rash, ulcers, or pruritus.  Neuro:  see hpi.No HA.  No unilateral sensory or motor dysfunction.  No dysarthria.  No double vision.  Musculoskeletal: see hpi  Mental:  No anxiety or depression.    PHYSICAL EXAM  Vitals: Blood pressure 104/72, pulse 81, temperature 98.7 °F (37.1 °C), temperature source Oral, resp. rate 18, height 5' 4.96\" (1.65 m), weight 98.5 kg (217 lb 3.2 oz), SpO2 96%.  Wt Readings from Last 3 Encounters:   03/10/25 98.5 kg (217 lb 3.2 oz)   02/18/25 95.3 kg (210 lb)   01/23/25 95.3 kg (210 lb)     General:  Patient is awake, alert, pleasant.  In no acute distress.   HEENT:  Normocephalic, atraumatic.  Anicteric sclerae.  No pallor.  Oral mucosa moist.  No ulcers, thrush or erythema.  TMs and canals normal. Nares normal  Neck:  Neck supple.  No lymphadenopathy.  No JVD or bruits.  Cardiac:  Regular rate and rhythm.  No murmur, rubs or gallops.  No extra sounds.  PMI is normal.  Lungs:  Clear to auscultation.  No wheezing.  No rales.  Good air entry.  No rhonchi.  No dullness to percussion.   Breasts: declined by patient  Abdomen:  Soft.   Nontender.  Nondistended.  No hepatosplenomegaly.  Extremities:  No clubbing, cyanosis or edema  Skin:  Warm and dry with no rashes.  Neurologic:  DTRs 2+/4 BUEs and BLEs    LABORATORY  I have reviewed the pertinent laboratory tests.  These are the pertinent findings:  Lab Results   Component Value Date    HGBA1C 5.2 01/06/2025    CHOLESTEROL 160 03/04/2025    HDL 96 03/04/2025    CALCLDL 54 03/04/2025    TRIGLYCERIDE 50 03/04/2025    POTASSIUM 3.9 03/04/2025    CREATININE 0.59 03/04/2025    AST 20 03/04/2025    BILIRUBIN 0.3 03/04/2025    TSH 1.280 09/26/2024    WBC 4.6 03/04/2025    HGB 11.0 (L) 03/04/2025     03/04/2025    VITD25 37.5 09/26/2024         ASSESSMENT/PLAN:  1. Welcome to Medicare preventive visit .  No concerns identified on HRA screen   2. Controlled substance agreement signed   Opioid Review:  Dilia is taking an opioid that I am prescribing.    Dilia shows stablization of pain. She reports no other side effects reported.   reviewed, no concerns regarding Opioid Use Disorder or diversion. Last Urine Drug Screen result was consistent with prescribed medications. Opioid Agreement obtained on 3/10/2025, to be renewed in 1 year. Naloxone previously prescribed in the last year. Will continue to assess the need for continued chronic opioid agonist therapy. Risk of opioid therapy reviewed with the patient.  hydrocodone/apap medication refilled with 30 day supply.    Opioid Risk Score (Click): 0   Opioid Risk Category: Low Risk      3. Chronic pain of both upper extremities   Stable on hydrocodone/apap, gabapentin   4. Need for vaccination . Prevnar 20 given   5. Neutropenia, cyclic  (CMD) . stable   6. Combined hyperlipidemia . Controlled on atorvastatin. Discussed that I would advise staying on this to lower risk of further CVA - goal LDL 70 or less .  Changed to 40mg tab per her request   7. History of CVA (cerebrovascular accident) without residual deficits . No recurrent symptoms. On  aspirin 81mg daily indefinitely   8. S/P total knee arthroplasty, right / slowly improving   9. Chronic, continuous use of opioids . Urine test pending   10. Nonrheumatic aortic valve insufficiency . stable   11. Kidney stone . No recent issues   12. S/p total knee replacement, bilateral . stable   13. Mild anemia . Will continue to monitor.  Check repeat cbc, iron panel, b12, folate in 6 mo   14. Postmenopausal . Baseline dexa scan ordered   15. Encounter for screening mammogram for breast cancer . Mammogram ordered.       Orders Placed This Encounter    OFFICE OR OTHER OUTPT VISIT EST PT 30 MINS OR MORE MOD MDM LVL 4    BD DEXA SCAN AXIAL SKELETON    MAMMO SCREENING BILATERAL W LAURO    PNEUMOCOCCAL 20 ([id number])    CBC with Automated Differential    Ferritin    Iron And total Iron Binding Capacity    Vitamin B12 And Folate    atorvastatin (LIPITOR) 40 MG tablet       Patient Instructions   Continue current medications - advise continuing atorvastatin but can change to 40mg daily with next refill  Keep upcoming neurology appointment  Continue diet, exercise efforts  Schedule dexa scan and mammogram  Follow up 6 months for recheck with nonfasting lab prior    Medicare Wellness Visit  Plan for Preventive Care    A good way for you to stay healthy is to use preventive care.  Medicare covers many services that can help you stay healthy.* The goal of these services is to find any health problems as quickly as possible. Finding problems early can help make them easier to treat.  Your personal plan below lists the services you may need and when they are due.      Health Maintenance Summary       Pneumococcal Vaccine 50+ (1 of 2 - PCV)  Order placed this encounter    Colorectal Cancer Screening (Colonoscopy - Every 10 Years)  Ordered on 12/10/2024    Traditional Medicare- Medicare Wellness Visit (Yearly)  Never done    Osteoporosis Screening (Once)  Never done    COVID-19 Vaccine (9 - Mixed Product risk 2024-25 season)   Next due on 4/16/2025    Breast Cancer Screening (Every 2 Years)  Next due on 1/26/2026    Depression Screening (Yearly)  Next due on 3/10/2026    DTaP/Tdap/Td Vaccine (3 - Td or Tdap)  Next due on 12/22/2031    Hepatitis C Screening   Completed    Shingles Vaccine   Completed    Respiratory Syncytial Virus (RSV) Vaccine 60+   Completed    Influenza Vaccine   Completed    Hepatitis A Vaccine   Aged Out    Meningococcal Vaccine   Aged Out    Hepatitis B Vaccine (For Physician/APC Discussion)   Aged Out    Meningococcal Serogroup B Vaccine   Aged Out    HPV Vaccine   Aged Out             Preventive Care for Women and Men    Heart Screenings (Cardiovascular):  Blood tests are used to check your cholesterol, lipid and triglyceride levels. High levels can increase your risk for heart disease and stroke. High levels can be treated with medications, diet and exercise. Lowering your levels can help keep your heart and blood vessels healthy.  Your provider will order these tests if they are needed.    An ultrasound is done to see if you have an abdominal aortic aneurysm (AAA).  This is an enlargement of one of the main blood vessels that delivers blood to the body.   In the [place], 9,000 deaths are caused by AAA.  You may not even know you have this problem and as many as 1 in 3 people will have a serious problem if it is not treated.  Early diagnosis allows for more effective treatment and cure.  If you have a family history of AAA or are a male age 65-75 who has smoked, you are at higher risk of an AAA.  Your provider can order this test, if needed.    Colorectal Screening:  There are many tests that are used to check for cancer of your colon and rectum. You and your provider should discuss what test is best for you and when to have it done.  Options include:  Screening Colonoscopy: exam of the entire colon, seen through a flexible lighted tube.  Flexible Sigmoidoscopy: exam of the last third (sigmoid portion) of  the colon and rectum, seen through a flexible lighted tube.  Cologuard DNA stool test: a sample of your stool is used to screen for cancer and unseen blood in your stool.  Fecal Occult Blood Test: a sample of your stool is studied to find any unseen blood    Flu Shot:  An immunization that helps to prevent influenza (the flu). You should get this every year. The best time to get the shot is in the fall.    Pneumococcal Shot:  Vaccines help prevent pneumococcal disease, which is any type of illness caused by Streptococcus pneumoniae bacteria. There are two kinds of pneumococcal vaccines available in the [place]:   Pneumococcal conjugate vaccines (PCV20 or Ceqzzzy56®)  Pneumococcal polysaccharide vaccine (PPSV23 or [id number]®)  For those who have never received any pneumococcal conjugate vaccine, CDC recommends PVC20 for adults 65 years or older and adults 19 through 64 years old with certain medical conditions or risk factors.   For those who have previously received PCV13, this should be followed by a dose of PPSV23.     Hepatitis B Shot:  An immunization that helps to protect people from getting Hepatitis B. Hepatitis B is a virus that spreads through contact with infected blood or body fluids. Many people with the virus do not have symptoms.  The virus can lead to serious problems, such as liver disease. Some people are at higher risk than others. Your doctor will tell you if you need this shot.     Diabetes Screening:  A test to measure sugar (glucose) in your blood is called a fasting blood sugar. Fasting means you cannot have food or drink for at least 8 hours before the test. This test can detect diabetes long before you may notice symptoms.    Glaucoma Screening:  Glaucoma screening is performed by your eye doctor. The test measures the fluid pressure inside your eyes to determine if you have glaucoma.     Hepatitis C Screening:  A blood test to see if you have the hepatitis C virus.  Hepatitis C  attacks the liver and is a major cause of chronic liver disease.  Medicare will cover a single screening for all adults born between 1945 & 1965, or high risk patients (people who have injected illegal drugs or people who have had blood transfusions).  High risk patients who continue to inject illegal drugs can be screened for Hepatitis C every year.    Smoking and Tobacco-Use Cessation Counseling:  Tobacco is the single greatest cause of disease and early death in our country today. Medication and counseling together can increase a person’s chance of quitting for good.   Medicare covers two quitting attempts per year, with four counseling sessions per attempt (eight sessions in a 12 month period)    Preventive Screening tests for Women    Screening Mammograms and Breast Exams:  An x-ray of your breasts to check for breast cancer before you or your doctor may be able to feel it.  If breast cancer is found early it can usually be treated with success.    Pelvic Exams and Pap Tests:  An exam to check for cervical and vaginal cancer. A Pap test is a lab test in which cells are taken from your cervix and sent to the lab to look for signs of cervical cancer. If cancer of the cervix is found early, chances for a cure are good. Testing can generally end at age 65, or if a woman has a hysterectomy for a benign condition. Your provider may recommend more frequent testing if certain abnormal results are found.    Bone Mass Measurements:  A painless x-ray of your bone density to see if you are at risk for a broken bone. Bone density refers to the thickness of bones or how tightly the bone tissue is packed.    Preventive Screening tests for Men    Prostate Screening:  Should you have a prostate cancer test (PSA)?  It is up to you to decide if you want a prostate cancer test. Talk to your clinician to find out if the test is right for you.  Things for you to consider and talk about should include:  Benefits and harms of the  test  Your family history  How your race/ethnicity may influence the test  If the test may impact other medical conditions you have  Your values on screenings and treatments    *Medicare pays for many preventive services to keep you healthy. For some of these services, you might have to pay a deductible, coinsurance, and / or copayment.  The amounts vary depending on the type of services you need and the kind of Medicare health plan you have.    For further details on screenings offered by Medicare please visit: https://www.medicare.gov/coverage/preventive-screening-services         All the above was discussed with the patient in details, questions were answered to the patient's satisfaction.  Patient left the office in stable condition with verbal and written instructions.             General: Denies fever, chills, sweating, anorexia, weight loss  No polyphagia, polyurea, polydypsia, malaise, or fatigue    Skin: Denies rashes, itching, or dryness. No change in size/color of moles. No tumors, brittle nails, pitted nails, or hair loss    Breast: Denies tenderness, lumps, or nipple discharge      Ophthalmologic: Denies diplopia, photophobia, lacrimation, blurred Vision , or discharge    ENMT Symptoms: Denies hearing difficulty, ear pain, tinnitus, or vertigo. No sinus symptoms, nasal discharge, or nasal obstruction    Respiratory and Thorax: Denies wheezing, dyspnea, cough, hemoptysis, or pleuritic chest pain     Cardiovascular: Denies chest pain, palpitations, dyspnea on exertion, orthopnea, paroxysmal nocturnal dyspnea, peripheral edema, or claudication    Gastrointestinal: constipation+  Denies nausea, vomiting, diarrhea, change in bowel habits, flatulence, abdominal pain, or melena    Genitourinary/ Pelvis: Denies hematuria, renal colic, or flank pain, urine discoloration, incontinence, dysuria, or urinary hesitancy. Normal urinary frequency. Denies nocturia, Denies abnormal vaginal bleeding, discharge, spotting, pelvic pain, or amniotic fluid leakage    Musculoskeletal: Denies arthralgia, arthritis, joint swelling, muscle cramping, muscle weakness.    Neurological: Denies transient paralysis, weakness, paresthesias, or seizures. Denies syncope, tremors, vertigo, loss of sensation, difficulty walking, loss of consciousness, hemiparesis, confusion, or facial palsy    Psychiatric: Denies history of post- partum depression, suicidal ideation, depression, anxiety, insomnia, memory loss, paranoia, mood swings, agitation, hallucinations, or hyperactivity    Hematology: Denies gum bleeding, nose bleeding, or skin lumps    Lymphatic: Denies enlarged or tender lymph nodes. No extremity swelling    Endocrine: PMH- GDM preprandial 80-90's Denies heat or cold intolerance,     Immunologic: Denies recurrent or persistent infections

## 2025-05-03 ENCOUNTER — NON-APPOINTMENT (OUTPATIENT)
Age: 40
End: 2025-05-03

## 2025-05-05 ENCOUNTER — APPOINTMENT (OUTPATIENT)
Dept: INTERNAL MEDICINE | Facility: CLINIC | Age: 40
End: 2025-05-05
Payer: MEDICAID

## 2025-05-05 ENCOUNTER — NON-APPOINTMENT (OUTPATIENT)
Age: 40
End: 2025-05-05

## 2025-05-05 VITALS
HEIGHT: 70 IN | OXYGEN SATURATION: 99 % | BODY MASS INDEX: 33.5 KG/M2 | DIASTOLIC BLOOD PRESSURE: 93 MMHG | SYSTOLIC BLOOD PRESSURE: 141 MMHG | HEART RATE: 81 BPM | WEIGHT: 234 LBS

## 2025-05-05 VITALS — DIASTOLIC BLOOD PRESSURE: 88 MMHG | SYSTOLIC BLOOD PRESSURE: 130 MMHG

## 2025-05-05 DIAGNOSIS — Z13.21 ENCOUNTER FOR SCREENING FOR NUTRITIONAL DISORDER: ICD-10-CM

## 2025-05-05 DIAGNOSIS — Z13.220 ENCOUNTER FOR SCREENING FOR LIPOID DISORDERS: ICD-10-CM

## 2025-05-05 DIAGNOSIS — M79.89 OTHER SPECIFIED SOFT TISSUE DISORDERS: ICD-10-CM

## 2025-05-05 DIAGNOSIS — Z13.1 ENCOUNTER FOR SCREENING FOR DIABETES MELLITUS: ICD-10-CM

## 2025-05-05 DIAGNOSIS — Z13.29 ENCOUNTER FOR SCREENING FOR OTHER SUSPECTED ENDOCRINE DISORDER: ICD-10-CM

## 2025-05-05 DIAGNOSIS — Z12.39 ENCOUNTER FOR OTHER SCREENING FOR MALIGNANT NEOPLASM OF BREAST: ICD-10-CM

## 2025-05-05 DIAGNOSIS — Z12.83 ENCOUNTER FOR SCREENING FOR MALIGNANT NEOPLASM OF SKIN: ICD-10-CM

## 2025-05-05 DIAGNOSIS — Z13.89 ENCOUNTER FOR SCREENING FOR OTHER DISORDER: ICD-10-CM

## 2025-05-05 DIAGNOSIS — Z00.00 ENCOUNTER FOR GENERAL ADULT MEDICAL EXAMINATION W/OUT ABNORMAL FINDINGS: ICD-10-CM

## 2025-05-05 DIAGNOSIS — Z01.00 ENCOUNTER FOR EXAMINATION OF EYES AND VISION W/OUT ABNORMAL FINDINGS: ICD-10-CM

## 2025-05-05 PROCEDURE — 99386 PREV VISIT NEW AGE 40-64: CPT | Mod: 25

## 2025-05-05 PROCEDURE — 93000 ELECTROCARDIOGRAM COMPLETE: CPT

## 2025-05-08 DIAGNOSIS — E78.5 HYPERLIPIDEMIA, UNSPECIFIED: ICD-10-CM

## 2025-05-08 LAB
25(OH)D3 SERPL-MCNC: 22 NG/ML
ALBUMIN SERPL ELPH-MCNC: 4.7 G/DL
ALP BLD-CCNC: 56 U/L
ALT SERPL-CCNC: 20 U/L
ANION GAP SERPL CALC-SCNC: 14 MMOL/L
APPEARANCE: CLEAR
AST SERPL-CCNC: 26 U/L
BASOPHILS # BLD AUTO: 0.03 K/UL
BASOPHILS NFR BLD AUTO: 0.6 %
BILIRUB SERPL-MCNC: 0.3 MG/DL
BILIRUBIN URINE: NEGATIVE
BLOOD URINE: NEGATIVE
BUN SERPL-MCNC: 8 MG/DL
CALCIUM SERPL-MCNC: 10 MG/DL
CHLORIDE SERPL-SCNC: 107 MMOL/L
CHOLEST SERPL-MCNC: 220 MG/DL
CO2 SERPL-SCNC: 19 MMOL/L
COLOR: YELLOW
CREAT SERPL-MCNC: 0.88 MG/DL
EGFRCR SERPLBLD CKD-EPI 2021: 84 ML/MIN/1.73M2
EOSINOPHIL # BLD AUTO: 0.09 K/UL
EOSINOPHIL NFR BLD AUTO: 1.7 %
ESTIMATED AVERAGE GLUCOSE: 114 MG/DL
GLUCOSE QUALITATIVE U: NEGATIVE MG/DL
GLUCOSE SERPL-MCNC: 87 MG/DL
HBA1C MFR BLD HPLC: 5.6 %
HCT VFR BLD CALC: 37.1 %
HDLC SERPL-MCNC: 59 MG/DL
HGB BLD-MCNC: 11.6 G/DL
IMM GRANULOCYTES NFR BLD AUTO: 0.2 %
KETONES URINE: NEGATIVE MG/DL
LDLC SERPL-MCNC: 147 MG/DL
LEUKOCYTE ESTERASE URINE: NEGATIVE
LYMPHOCYTES # BLD AUTO: 1.69 K/UL
LYMPHOCYTES NFR BLD AUTO: 31.2 %
MAN DIFF?: NORMAL
MCHC RBC-ENTMCNC: 26.4 PG
MCHC RBC-ENTMCNC: 31.3 G/DL
MCV RBC AUTO: 84.3 FL
MONOCYTES # BLD AUTO: 0.39 K/UL
MONOCYTES NFR BLD AUTO: 7.2 %
NEUTROPHILS # BLD AUTO: 3.2 K/UL
NEUTROPHILS NFR BLD AUTO: 59.1 %
NITRITE URINE: NEGATIVE
NONHDLC SERPL-MCNC: 162 MG/DL
PH URINE: 6
PLATELET # BLD AUTO: 286 K/UL
POTASSIUM SERPL-SCNC: 4 MMOL/L
PROT SERPL-MCNC: 7.7 G/DL
PROTEIN URINE: NEGATIVE MG/DL
RBC # BLD: 4.4 M/UL
RBC # FLD: 14.3 %
SODIUM SERPL-SCNC: 139 MMOL/L
SPECIFIC GRAVITY URINE: 1.01
TRIGL SERPL-MCNC: 84 MG/DL
TSH SERPL-ACNC: 1.03 UIU/ML
UROBILINOGEN URINE: 0.2 MG/DL
VIT B12 SERPL-MCNC: 520 PG/ML
WBC # FLD AUTO: 5.41 K/UL

## 2025-05-08 RX ORDER — ATORVASTATIN CALCIUM 10 MG/1
10 TABLET, FILM COATED ORAL
Qty: 1 | Refills: 0 | Status: ACTIVE | COMMUNITY
Start: 2025-05-08 | End: 1900-01-01

## 2025-05-13 ENCOUNTER — OUTPATIENT (OUTPATIENT)
Dept: OUTPATIENT SERVICES | Facility: HOSPITAL | Age: 40
LOS: 1 days | End: 2025-05-13

## 2025-05-13 ENCOUNTER — APPOINTMENT (OUTPATIENT)
Dept: MAMMOGRAPHY | Facility: IMAGING CENTER | Age: 40
End: 2025-05-13

## 2025-05-13 ENCOUNTER — APPOINTMENT (OUTPATIENT)
Dept: ULTRASOUND IMAGING | Facility: IMAGING CENTER | Age: 40
End: 2025-05-13

## 2025-05-13 DIAGNOSIS — Z87.59 PERSONAL HISTORY OF OTHER COMPLICATIONS OF PREGNANCY, CHILDBIRTH AND THE PUERPERIUM: Chronic | ICD-10-CM

## 2025-05-13 DIAGNOSIS — Z90.79 ACQUIRED ABSENCE OF OTHER GENITAL ORGAN(S): Chronic | ICD-10-CM

## 2025-05-13 DIAGNOSIS — Z98.890 OTHER SPECIFIED POSTPROCEDURAL STATES: Chronic | ICD-10-CM

## 2025-05-13 DIAGNOSIS — Z12.39 ENCOUNTER FOR OTHER SCREENING FOR MALIGNANT NEOPLASM OF BREAST: ICD-10-CM
